# Patient Record
Sex: FEMALE | Race: WHITE | Employment: OTHER | URBAN - METROPOLITAN AREA
[De-identification: names, ages, dates, MRNs, and addresses within clinical notes are randomized per-mention and may not be internally consistent; named-entity substitution may affect disease eponyms.]

---

## 2017-07-11 ENCOUNTER — HOSPITAL ENCOUNTER (OUTPATIENT)
Dept: GENERAL RADIOLOGY | Age: 67
Discharge: HOME | End: 2017-07-11
Payer: MEDICARE

## 2017-07-11 DIAGNOSIS — M48.062 NEUROGENIC CLAUDICATION DUE TO LUMBAR SPINAL STENOSIS: ICD-10-CM

## 2017-07-11 DIAGNOSIS — M54.50 ACUTE MIDLINE LOW BACK PAIN WITHOUT SCIATICA: ICD-10-CM

## 2017-07-11 PROBLEM — M48.061 SPINAL STENOSIS, LUMBAR: Status: ACTIVE | Noted: 2017-07-11

## 2017-07-11 PROCEDURE — 72110 X-RAY EXAM L-2 SPINE 4/>VWS: CPT

## 2017-09-25 ENCOUNTER — HOSPITAL ENCOUNTER (OUTPATIENT)
Dept: SURGERY | Age: 67
Discharge: HOME | End: 2017-09-25
Payer: MEDICARE

## 2017-09-25 VITALS
TEMPERATURE: 98.1 F | HEART RATE: 77 BPM | BODY MASS INDEX: 34.76 KG/M2 | DIASTOLIC BLOOD PRESSURE: 71 MMHG | HEIGHT: 61 IN | WEIGHT: 184.1 LBS | SYSTOLIC BLOOD PRESSURE: 122 MMHG | OXYGEN SATURATION: 95 % | RESPIRATION RATE: 20 BRPM

## 2017-09-25 LAB
ANION GAP SERPL CALC-SCNC: 9 MMOL/L (ref 7–16)
APPEARANCE UR: ABNORMAL
ATRIAL RATE: 69 BPM
BACTERIA SPEC CULT: NORMAL
BACTERIA URNS QL MICRO: ABNORMAL /HPF
BASOPHILS # BLD: 0 K/UL (ref 0–0.2)
BASOPHILS NFR BLD: 0 % (ref 0–2)
BILIRUB UR QL: NEGATIVE
BUN SERPL-MCNC: 11 MG/DL (ref 8–23)
CALCIUM SERPL-MCNC: 9 MG/DL (ref 8.3–10.4)
CALCULATED P AXIS, ECG09: 37 DEGREES
CALCULATED R AXIS, ECG10: -66 DEGREES
CALCULATED T AXIS, ECG11: 13 DEGREES
CASTS URNS QL MICRO: ABNORMAL /LPF
CHLORIDE SERPL-SCNC: 105 MMOL/L (ref 98–107)
CO2 SERPL-SCNC: 25 MMOL/L (ref 21–32)
COLOR UR: YELLOW
CREAT SERPL-MCNC: 0.74 MG/DL (ref 0.6–1)
DIAGNOSIS, 93000: NORMAL
DIFFERENTIAL METHOD BLD: NORMAL
EOSINOPHIL # BLD: 0.1 K/UL (ref 0–0.8)
EOSINOPHIL NFR BLD: 1 % (ref 0.5–7.8)
EPI CELLS #/AREA URNS HPF: ABNORMAL /HPF
ERYTHROCYTE [DISTWIDTH] IN BLOOD BY AUTOMATED COUNT: 12.3 % (ref 11.9–14.6)
EST. AVERAGE GLUCOSE BLD GHB EST-MCNC: 126 MG/DL
GLUCOSE BLD STRIP.AUTO-MCNC: 147 MG/DL (ref 65–100)
GLUCOSE SERPL-MCNC: 133 MG/DL (ref 65–100)
GLUCOSE UR STRIP.AUTO-MCNC: NEGATIVE MG/DL
HBA1C MFR BLD: 6 % (ref 4.8–6)
HCT VFR BLD AUTO: 41.6 % (ref 35.8–46.3)
HGB BLD-MCNC: 14.5 G/DL (ref 11.7–15.4)
HGB UR QL STRIP: NEGATIVE
IMM GRANULOCYTES # BLD: 0 K/UL (ref 0–0.5)
IMM GRANULOCYTES NFR BLD: 0.2 % (ref 0–5)
KETONES UR QL STRIP.AUTO: NEGATIVE MG/DL
LEUKOCYTE ESTERASE UR QL STRIP.AUTO: ABNORMAL
LYMPHOCYTES # BLD: 1.7 K/UL (ref 0.5–4.6)
LYMPHOCYTES NFR BLD: 18 % (ref 13–44)
MCH RBC QN AUTO: 32.4 PG (ref 26.1–32.9)
MCHC RBC AUTO-ENTMCNC: 34.9 G/DL (ref 31.4–35)
MCV RBC AUTO: 93.1 FL (ref 79.6–97.8)
MONOCYTES # BLD: 0.9 K/UL (ref 0.1–1.3)
MONOCYTES NFR BLD: 10 % (ref 4–12)
NEUTS SEG # BLD: 6.7 K/UL (ref 1.7–8.2)
NEUTS SEG NFR BLD: 71 % (ref 43–78)
NITRITE UR QL STRIP.AUTO: POSITIVE
P-R INTERVAL, ECG05: 128 MS
PH UR STRIP: 6 [PH] (ref 5–9)
PLATELET # BLD AUTO: 215 K/UL (ref 150–450)
PMV BLD AUTO: 10.8 FL (ref 10.8–14.1)
POTASSIUM SERPL-SCNC: 3.7 MMOL/L (ref 3.5–5.1)
PROT UR STRIP-MCNC: NEGATIVE MG/DL
Q-T INTERVAL, ECG07: 430 MS
QRS DURATION, ECG06: 124 MS
QTC CALCULATION (BEZET), ECG08: 460 MS
RBC # BLD AUTO: 4.47 M/UL (ref 4.05–5.25)
RBC #/AREA URNS HPF: ABNORMAL /HPF
SERVICE CMNT-IMP: NORMAL
SODIUM SERPL-SCNC: 139 MMOL/L (ref 136–145)
SP GR UR REFRACTOMETRY: 1.02 (ref 1–1.02)
UROBILINOGEN UR QL STRIP.AUTO: 0.2 EU/DL (ref 0.2–1)
VENTRICULAR RATE, ECG03: 69 BPM
WBC # BLD AUTO: 9.4 K/UL (ref 4.3–11.1)
WBC URNS QL MICRO: ABNORMAL /HPF

## 2017-09-25 PROCEDURE — 85025 COMPLETE CBC W/AUTO DIFF WBC: CPT | Performed by: NEUROLOGICAL SURGERY

## 2017-09-25 PROCEDURE — 87641 MR-STAPH DNA AMP PROBE: CPT | Performed by: NEUROLOGICAL SURGERY

## 2017-09-25 PROCEDURE — 82962 GLUCOSE BLOOD TEST: CPT

## 2017-09-25 PROCEDURE — 77030027138 HC INCENT SPIROMETER -A

## 2017-09-25 PROCEDURE — 80048 BASIC METABOLIC PNL TOTAL CA: CPT | Performed by: NEUROLOGICAL SURGERY

## 2017-09-25 PROCEDURE — 93005 ELECTROCARDIOGRAM TRACING: CPT | Performed by: ANESTHESIOLOGY

## 2017-09-25 PROCEDURE — 83036 HEMOGLOBIN GLYCOSYLATED A1C: CPT | Performed by: NEUROLOGICAL SURGERY

## 2017-09-25 PROCEDURE — 81001 URINALYSIS AUTO W/SCOPE: CPT | Performed by: NEUROLOGICAL SURGERY

## 2017-09-25 RX ORDER — GLUCOSAMINE SULFATE 1500 MG
1000 POWDER IN PACKET (EA) ORAL DAILY
COMMUNITY

## 2017-09-25 NOTE — PERIOP NOTES
Recent Results (from the past 12 hour(s))   URINALYSIS W/ RFLX MICROSCOPIC    Collection Time: 09/25/17 11:06 AM   Result Value Ref Range    Color YELLOW      Appearance CLOUDY      Specific gravity 1.018 1.001 - 1.023      pH (UA) 6.0 5.0 - 9.0      Protein NEGATIVE  NEG mg/dL    Glucose NEGATIVE  mg/dL    Ketone NEGATIVE  NEG mg/dL    Bilirubin NEGATIVE  NEG      Blood NEGATIVE  NEG      Urobilinogen 0.2 0.2 - 1.0 EU/dL    Nitrites POSITIVE (A) NEG      Leukocyte Esterase MODERATE (A) NEG      WBC 20-50 0 /hpf    RBC 0-3 0 /hpf    Epithelial cells 0-3 0 /hpf    Bacteria 4+ (H) 0 /hpf    Casts 3-5 0 /lpf   HEMOGLOBIN A1C WITH EAG    Collection Time: 09/25/17 11:07 AM   Result Value Ref Range    Hemoglobin A1c 6.0 4.8 - 6.0 %    Est. average glucose 126 mg/dL   CBC WITH AUTOMATED DIFF    Collection Time: 09/25/17 11:07 AM   Result Value Ref Range    WBC 9.4 4.3 - 11.1 K/uL    RBC 4.47 4.05 - 5.25 M/uL    HGB 14.5 11.7 - 15.4 g/dL    HCT 41.6 35.8 - 46.3 %    MCV 93.1 79.6 - 97.8 FL    MCH 32.4 26.1 - 32.9 PG    MCHC 34.9 31.4 - 35.0 g/dL    RDW 12.3 11.9 - 14.6 %    PLATELET 468 139 - 790 K/uL    MPV 10.8 10.8 - 14.1 FL    DF AUTOMATED      NEUTROPHILS 71 43 - 78 %    LYMPHOCYTES 18 13 - 44 %    MONOCYTES 10 4.0 - 12.0 %    EOSINOPHILS 1 0.5 - 7.8 %    BASOPHILS 0 0.0 - 2.0 %    IMMATURE GRANULOCYTES 0.2 0.0 - 5.0 %    ABS. NEUTROPHILS 6.7 1.7 - 8.2 K/UL    ABS. LYMPHOCYTES 1.7 0.5 - 4.6 K/UL    ABS. MONOCYTES 0.9 0.1 - 1.3 K/UL    ABS. EOSINOPHILS 0.1 0.0 - 0.8 K/UL    ABS. BASOPHILS 0.0 0.0 - 0.2 K/UL    ABS. IMM.  GRANS. 0.0 0.0 - 0.5 K/UL   METABOLIC PANEL, BASIC    Collection Time: 09/25/17 11:07 AM   Result Value Ref Range    Sodium 139 136 - 145 mmol/L    Potassium 3.7 3.5 - 5.1 mmol/L    Chloride 105 98 - 107 mmol/L    CO2 25 21 - 32 mmol/L    Anion gap 9 7 - 16 mmol/L    Glucose 133 (H) 65 - 100 mg/dL    BUN 11 8 - 23 MG/DL    Creatinine 0.74 0.6 - 1.0 MG/DL    GFR est AA >60 >60 ml/min/1.73m2    GFR est non-AA >60 >60 ml/min/1.73m2    Calcium 9.0 8.3 - 10.4 MG/DL   MSSA/MRSA SC BY PCR, NASAL SWAB    Collection Time: 09/25/17 11:07 AM   Result Value Ref Range    Special Requests: NO SPECIAL REQUESTS      Culture result:        SA target not detected. A MRSA NEGATIVE, SA NEGATIVE test result does not preclude MRSA or SA nasal colonization. GLUCOSE, POC    Collection Time: 09/25/17 11:11 AM   Result Value Ref Range    Glucose (POC) 147 (H) 65 - 100 mg/dL   EKG, 12 LEAD, INITIAL    Collection Time: 09/25/17 11:17 AM   Result Value Ref Range    Ventricular Rate 69 BPM    Atrial Rate 69 BPM    P-R Interval 128 ms    QRS Duration 124 ms    Q-T Interval 430 ms    QTC Calculation (Bezet) 460 ms    Calculated P Axis 37 degrees    Calculated R Axis -66 degrees    Calculated T Axis 13 degrees    Diagnosis       Normal sinus rhythm  Right bundle branch block  Left anterior fascicular block  !!! Bifascicular block !!!   When compared with ECG of 19-AUG-2009 14:55,  Right bundle branch block has replaced Incomplete right bundle branch block  Confirmed by ST PRASHANT CASTRO MD (), SHAISTA GARCÍA ([id number]) on 9/25/2017 12:27:32 PM

## 2017-09-25 NOTE — PERIOP NOTES
Reviewed EKG from 9/25/17 w/ Dr. Juan C Good in person and that pts hx is DM, HTN, w/ no cardiac hx or workup. No orders given.

## 2017-09-25 NOTE — PERIOP NOTES
Patient verified name, , and surgery as listed in [facility]. TYPE  CASE:3              Orders:  received  Labs per Spine protocol:  A1c/CBC/BMP/UA/MRSA   Labs per anesthesia protocol: GLUCOSE 147. T/S Order signed and held for PREOP   EKG/cardiac records  :  EKG PERFORMED PER PROTOCOL    Instructed patient to continue previous medications as prescribed prior to surgery and  to take the following medications the day of surgery according to anesthesia guidelines with a small sip of water, med bottles NOT visualized : XANAX IF NEEDED        Continue all previous medications unless otherwise directed. Instructed patient to hold  the following medications prior to surgery: ALL VITAMINS/SUPPLEMENTS, MELOXICAM    Pt viewed Spine Pre-hab video. All further questions were addressed. Pt was provided with antibacterial soap, Hibiclens, long-handled sponge, Spine Recovery booklet and incentive spirometer. Pt correctly demonstrated use of incentive spirometer and instruction to bring it to the hospital on day of surgery. Handouts and all Surgery instructions provided to pt and pt verbalizes understanding. Patient instructed on the following and verbalized understanding:  Arrive at MAIN entrance, time of arrival to be called the day before by 1700. Responsible adult must drive patient to and from hospital, stay during surgery and 24 hours postoperatively. Npo after midnight including gum, mints and ice chips. Use hibiclens in the shower the night before and the morning of surgery. Leave all valuables at home. Instructed on no jewelry or body piercings on the dos. Bring insurance card and ID. No perfumes, oil, powder, colognes, makeup or  lotions on the skin. Patient verbalized understanding of all instructions and provided all medical/health information to the best of their ability.        Pt teach back was successful and pt demonstrates knowledge of instruction

## 2017-11-27 ENCOUNTER — HOSPITAL ENCOUNTER (OUTPATIENT)
Dept: SURGERY | Age: 67
Discharge: HOME | DRG: 460 | End: 2017-11-27
Payer: MEDICARE

## 2017-11-27 VITALS
SYSTOLIC BLOOD PRESSURE: 122 MMHG | DIASTOLIC BLOOD PRESSURE: 71 MMHG | RESPIRATION RATE: 18 BRPM | BODY MASS INDEX: 33.16 KG/M2 | OXYGEN SATURATION: 98 % | HEIGHT: 62 IN | HEART RATE: 84 BPM | TEMPERATURE: 98.4 F | WEIGHT: 180.19 LBS

## 2017-11-27 LAB
ANION GAP SERPL CALC-SCNC: 8 MMOL/L (ref 7–16)
APPEARANCE UR: CLEAR
BACTERIA SPEC CULT: NORMAL
BILIRUB UR QL: NEGATIVE
BUN SERPL-MCNC: 13 MG/DL (ref 8–23)
CALCIUM SERPL-MCNC: 9.2 MG/DL (ref 8.3–10.4)
CHLORIDE SERPL-SCNC: 104 MMOL/L (ref 98–107)
CO2 SERPL-SCNC: 29 MMOL/L (ref 21–32)
COLOR UR: YELLOW
CREAT SERPL-MCNC: 0.92 MG/DL (ref 0.6–1)
ERYTHROCYTE [DISTWIDTH] IN BLOOD BY AUTOMATED COUNT: 12.4 % (ref 11.9–14.6)
EST. AVERAGE GLUCOSE BLD GHB EST-MCNC: 123 MG/DL
GLUCOSE BLD STRIP.AUTO-MCNC: 175 MG/DL (ref 65–100)
GLUCOSE SERPL-MCNC: 179 MG/DL (ref 65–100)
GLUCOSE UR STRIP.AUTO-MCNC: NEGATIVE MG/DL
HBA1C MFR BLD: 5.9 % (ref 4.8–6)
HCT VFR BLD AUTO: 42.4 % (ref 35.8–46.3)
HGB BLD-MCNC: 14.3 G/DL (ref 11.7–15.4)
HGB UR QL STRIP: NEGATIVE
KETONES UR QL STRIP.AUTO: NEGATIVE MG/DL
LEUKOCYTE ESTERASE UR QL STRIP.AUTO: NEGATIVE
MCH RBC QN AUTO: 31.7 PG (ref 26.1–32.9)
MCHC RBC AUTO-ENTMCNC: 33.7 G/DL (ref 31.4–35)
MCV RBC AUTO: 94 FL (ref 79.6–97.8)
NITRITE UR QL STRIP.AUTO: NEGATIVE
PH UR STRIP: 6 [PH] (ref 5–9)
PLATELET # BLD AUTO: 226 K/UL (ref 150–450)
PMV BLD AUTO: 10.6 FL (ref 10.8–14.1)
POTASSIUM SERPL-SCNC: 3.9 MMOL/L (ref 3.5–5.1)
PROT UR STRIP-MCNC: NEGATIVE MG/DL
RBC # BLD AUTO: 4.51 M/UL (ref 4.05–5.25)
SERVICE CMNT-IMP: NORMAL
SODIUM SERPL-SCNC: 141 MMOL/L (ref 136–145)
SP GR UR REFRACTOMETRY: 1.02 (ref 1–1.02)
UROBILINOGEN UR QL STRIP.AUTO: 0.2 EU/DL (ref 0.2–1)
WBC # BLD AUTO: 7.2 K/UL (ref 4.3–11.1)

## 2017-11-27 NOTE — PERIOP NOTES
Patient verified name, , and surgery as listed in [facility]. Patient provided medical/health information and PTA medications to the best of their ability. TYPE  CASE:3  Orders per surgeon: Received   Labs per surgeon:CBC,BMP,UA, MRSA, Hgb A1c. Results: pending   Labs per anesthesia protocol: , SQBS 171,T/S DOS. Results pending  EKG  :  2017  Dr Nigel Scott notified of pt surgery and length. Does not need to see pt at this time. Patient provided with and instructed on education handouts including Guide to Surgery, blood transfusions, pain management, and hand hygiene for the family and community, and [facility] brochure. Hibiclens and instructions given per hospital policy. Instructed patient to continue previous medications as prescribed prior to surgery unless otherwise directed and to take the following medications the day of surgery according to anesthesia guidelines : Xanax . Instructed patient to hold  the following medications: Vitamin D, Fish Oil, Meloxicam.    Original medication prescription bottles not  visualized during patient appointment. Patient teach back successful and patient demonstrates knowledge of instruction.

## 2017-11-27 NOTE — PERIOP NOTES
Recent Results (from the past 12 hour(s))   GLUCOSE, POC    Collection Time: 11/27/17  1:17 PM   Result Value Ref Range    Glucose (POC) 175 (H) 65 - 100 mg/dL   CBC W/O DIFF    Collection Time: 11/27/17  1:19 PM   Result Value Ref Range    WBC 7.2 4.3 - 11.1 K/uL    RBC 4.51 4.05 - 5.25 M/uL    HGB 14.3 11.7 - 15.4 g/dL    HCT 42.4 35.8 - 46.3 %    MCV 94.0 79.6 - 97.8 FL    MCH 31.7 26.1 - 32.9 PG    MCHC 33.7 31.4 - 35.0 g/dL    RDW 12.4 11.9 - 14.6 %    PLATELET 659 021 - 338 K/uL    MPV 10.6 (L) 10.8 - 86.9 FL   METABOLIC PANEL, BASIC    Collection Time: 11/27/17  1:19 PM   Result Value Ref Range    Sodium 141 136 - 145 mmol/L    Potassium 3.9 3.5 - 5.1 mmol/L    Chloride 104 98 - 107 mmol/L    CO2 29 21 - 32 mmol/L    Anion gap 8 7 - 16 mmol/L    Glucose 179 (H) 65 - 100 mg/dL    BUN 13 8 - 23 MG/DL    Creatinine 0.92 0.6 - 1.0 MG/DL    GFR est AA >60 >60 ml/min/1.73m2    GFR est non-AA >60 >60 ml/min/1.73m2    Calcium 9.2 8.3 - 10.4 MG/DL   URINALYSIS W/ RFLX MICROSCOPIC    Collection Time: 11/27/17  1:19 PM   Result Value Ref Range    Color YELLOW      Appearance CLEAR      Specific gravity 1.019 1.001 - 1.023      pH (UA) 6.0 5.0 - 9.0      Protein NEGATIVE  NEG mg/dL    Glucose NEGATIVE  mg/dL    Ketone NEGATIVE  NEG mg/dL    Bilirubin NEGATIVE  NEG      Blood NEGATIVE  NEG      Urobilinogen 0.2 0.2 - 1.0 EU/dL    Nitrites NEGATIVE  NEG      Leukocyte Esterase NEGATIVE  NEG     MSSA/MRSA SC BY PCR, NASAL SWAB    Collection Time: 11/27/17  1:19 PM   Result Value Ref Range    Special Requests: NO SPECIAL REQUESTS      Culture result:        SA target not detected. A MRSA NEGATIVE, SA NEGATIVE test result does not preclude MRSA or SA nasal colonization.    HEMOGLOBIN A1C WITH EAG    Collection Time: 11/27/17  1:19 PM   Result Value Ref Range    Hemoglobin A1c 5.9 4.8 - 6.0 %    Est. average glucose 123 mg/dL   Reviewed

## 2017-11-30 ENCOUNTER — HOSPITAL ENCOUNTER (INPATIENT)
Age: 67
LOS: 4 days | Discharge: HOME | DRG: 460 | End: 2017-12-04
Attending: NEUROLOGICAL SURGERY | Admitting: NEUROLOGICAL SURGERY
Payer: MEDICARE

## 2017-11-30 ENCOUNTER — APPOINTMENT (OUTPATIENT)
Dept: GENERAL RADIOLOGY | Age: 67
DRG: 460 | End: 2017-11-30
Attending: NEUROLOGICAL SURGERY
Payer: MEDICARE

## 2017-11-30 ENCOUNTER — ANESTHESIA EVENT (OUTPATIENT)
Dept: SURGERY | Age: 67
DRG: 460 | End: 2017-11-30
Payer: MEDICARE

## 2017-11-30 ENCOUNTER — ANESTHESIA (OUTPATIENT)
Dept: SURGERY | Age: 67
DRG: 460 | End: 2017-11-30
Payer: MEDICARE

## 2017-11-30 PROBLEM — M48.062 LUMBAR STENOSIS WITH NEUROGENIC CLAUDICATION: Status: ACTIVE | Noted: 2017-11-30

## 2017-11-30 LAB
ABO + RH BLD: NORMAL
BLOOD GROUP ANTIBODIES SERPL: NORMAL
GLUCOSE BLD STRIP.AUTO-MCNC: 107 MG/DL (ref 65–100)
GLUCOSE BLD STRIP.AUTO-MCNC: 153 MG/DL (ref 65–100)
SPECIMEN EXP DATE BLD: NORMAL

## 2017-11-30 PROCEDURE — 77030003029 HC SUT VCRL J&J -B: Performed by: NEUROLOGICAL SURGERY

## 2017-11-30 PROCEDURE — 76060000038 HC ANESTHESIA 3.5 TO 4 HR: Performed by: NEUROLOGICAL SURGERY

## 2017-11-30 PROCEDURE — 77030010507 HC ADH SKN DERMBND J&J -B: Performed by: NEUROLOGICAL SURGERY

## 2017-11-30 PROCEDURE — 77030004391 HC BUR FLUT MEDT -C: Performed by: NEUROLOGICAL SURGERY

## 2017-11-30 PROCEDURE — 77030020782 HC GWN BAIR PAWS FLX 3M -B: Performed by: ANESTHESIOLOGY

## 2017-11-30 PROCEDURE — 77030019908 HC STETH ESOPH SIMS -A: Performed by: ANESTHESIOLOGY

## 2017-11-30 PROCEDURE — 77030020407 HC IV BLD WRMR ST 3M -A: Performed by: ANESTHESIOLOGY

## 2017-11-30 PROCEDURE — 77030011640 HC PAD GRND REM COVD -A: Performed by: NEUROLOGICAL SURGERY

## 2017-11-30 PROCEDURE — 65270000029 HC RM PRIVATE

## 2017-11-30 PROCEDURE — 74011250637 HC RX REV CODE- 250/637: Performed by: ANESTHESIOLOGY

## 2017-11-30 PROCEDURE — 77030003028 HC SUT VCRL J&J -A: Performed by: NEUROLOGICAL SURGERY

## 2017-11-30 PROCEDURE — 77030014007 HC SPNG HEMSTAT J&J -B: Performed by: NEUROLOGICAL SURGERY

## 2017-11-30 PROCEDURE — 74011000250 HC RX REV CODE- 250: Performed by: ANESTHESIOLOGY

## 2017-11-30 PROCEDURE — 77030018673: Performed by: NEUROLOGICAL SURGERY

## 2017-11-30 PROCEDURE — 94760 N-INVAS EAR/PLS OXIMETRY 1: CPT

## 2017-11-30 PROCEDURE — 82962 GLUCOSE BLOOD TEST: CPT

## 2017-11-30 PROCEDURE — 77030032490 HC SLV COMPR SCD KNE COVD -B: Performed by: NEUROLOGICAL SURGERY

## 2017-11-30 PROCEDURE — 77030018836 HC SOL IRR NACL ICUM -A: Performed by: NEUROLOGICAL SURGERY

## 2017-11-30 PROCEDURE — 0SB20ZZ EXCISION OF LUMBAR VERTEBRAL DISC, OPEN APPROACH: ICD-10-PCS | Performed by: NEUROLOGICAL SURGERY

## 2017-11-30 PROCEDURE — 74011000250 HC RX REV CODE- 250

## 2017-11-30 PROCEDURE — 74011250636 HC RX REV CODE- 250/636: Performed by: NEUROLOGICAL SURGERY

## 2017-11-30 PROCEDURE — 0QW004Z REVISION OF INTERNAL FIXATION DEVICE IN LUMBAR VERTEBRA, OPEN APPROACH: ICD-10-PCS | Performed by: NEUROLOGICAL SURGERY

## 2017-11-30 PROCEDURE — 77030022373 HC SPCR SPN STAXX SPWV -K1: Performed by: NEUROLOGICAL SURGERY

## 2017-11-30 PROCEDURE — 77030003451 HC NDL BIOP BN MEDT -C: Performed by: NEUROLOGICAL SURGERY

## 2017-11-30 PROCEDURE — 0SG00AJ FUSION OF LUMBAR VERTEBRAL JOINT WITH INTERBODY FUSION DEVICE, POSTERIOR APPROACH, ANTERIOR COLUMN, OPEN APPROACH: ICD-10-PCS | Performed by: NEUROLOGICAL SURGERY

## 2017-11-30 PROCEDURE — 74011250636 HC RX REV CODE- 250/636: Performed by: ANESTHESIOLOGY

## 2017-11-30 PROCEDURE — 77030012894: Performed by: NEUROLOGICAL SURGERY

## 2017-11-30 PROCEDURE — 77030034475 HC MISC IMPL SPN: Performed by: NEUROLOGICAL SURGERY

## 2017-11-30 PROCEDURE — C1713 ANCHOR/SCREW BN/BN,TIS/BN: HCPCS | Performed by: NEUROLOGICAL SURGERY

## 2017-11-30 PROCEDURE — 77030003666 HC NDL SPINAL BD -A: Performed by: NEUROLOGICAL SURGERY

## 2017-11-30 PROCEDURE — 77030027138 HC INCENT SPIROMETER -A

## 2017-11-30 PROCEDURE — 01NB0ZZ RELEASE LUMBAR NERVE, OPEN APPROACH: ICD-10-PCS | Performed by: NEUROLOGICAL SURGERY

## 2017-11-30 PROCEDURE — 74011250636 HC RX REV CODE- 250/636

## 2017-11-30 PROCEDURE — 74011250637 HC RX REV CODE- 250/637: Performed by: NEUROLOGICAL SURGERY

## 2017-11-30 PROCEDURE — 77030019940 HC BLNKT HYPOTHRM STRY -B: Performed by: ANESTHESIOLOGY

## 2017-11-30 PROCEDURE — 77030012407 HC DRN WND BARD -B: Performed by: NEUROLOGICAL SURGERY

## 2017-11-30 PROCEDURE — 77030008477 HC STYL SATN SLP COVD -A: Performed by: ANESTHESIOLOGY

## 2017-11-30 PROCEDURE — 76210000006 HC OR PH I REC 0.5 TO 1 HR: Performed by: NEUROLOGICAL SURGERY

## 2017-11-30 PROCEDURE — 77030003193 HC GRFT RECOMB BN MEDT -H: Performed by: NEUROLOGICAL SURGERY

## 2017-11-30 PROCEDURE — 86900 BLOOD TYPING SEROLOGIC ABO: CPT | Performed by: ANESTHESIOLOGY

## 2017-11-30 PROCEDURE — 77030008703 HC TU ET UNCUF COVD -A: Performed by: ANESTHESIOLOGY

## 2017-11-30 PROCEDURE — 74011000272 HC RX REV CODE- 272: Performed by: NEUROLOGICAL SURGERY

## 2017-11-30 PROCEDURE — 77030030163 HC BN WAX J&J -A: Performed by: NEUROLOGICAL SURGERY

## 2017-11-30 PROCEDURE — 77030013567 HC DRN WND RESERV BARD -A: Performed by: NEUROLOGICAL SURGERY

## 2017-11-30 PROCEDURE — 74011000250 HC RX REV CODE- 250: Performed by: NEUROLOGICAL SURGERY

## 2017-11-30 PROCEDURE — 77010033678 HC OXYGEN DAILY

## 2017-11-30 PROCEDURE — 77030019557 HC ELECTRD VES SEAL MEDT -F: Performed by: NEUROLOGICAL SURGERY

## 2017-11-30 PROCEDURE — 76010000174 HC OR TIME 3.5 TO 4 HR INTENSV-TIER 1: Performed by: NEUROLOGICAL SURGERY

## 2017-11-30 DEVICE — SPACER SPNL W9XH7XL22MM TANT MRK LO PROF SELF EXP CONVX: Type: IMPLANTABLE DEVICE | Site: SPINE LUMBAR | Status: FUNCTIONAL

## 2017-11-30 DEVICE — SCREW 75446550 MULTI AXIAL 6.5X50  TI
Type: IMPLANTABLE DEVICE | Site: SPINE LUMBAR | Status: FUNCTIONAL
Brand: CD HORIZON® SPINAL SYSTEM

## 2017-11-30 DEVICE — IMPLANTABLE DEVICE: Type: IMPLANTABLE DEVICE | Site: SPINE LUMBAR | Status: FUNCTIONAL

## 2017-11-30 DEVICE — BONE GRAFT KIT 7510100 INFUSE X SMALL
Type: IMPLANTABLE DEVICE | Site: SPINE LUMBAR | Status: FUNCTIONAL
Brand: INFUSE® BONE GRAFT

## 2017-11-30 RX ORDER — NEOSTIGMINE METHYLSULFATE 1 MG/ML
INJECTION INTRAVENOUS AS NEEDED
Status: DISCONTINUED | OUTPATIENT
Start: 2017-11-30 | End: 2017-11-30 | Stop reason: HOSPADM

## 2017-11-30 RX ORDER — SODIUM CHLORIDE, SODIUM LACTATE, POTASSIUM CHLORIDE, CALCIUM CHLORIDE 600; 310; 30; 20 MG/100ML; MG/100ML; MG/100ML; MG/100ML
125 INJECTION, SOLUTION INTRAVENOUS CONTINUOUS
Status: DISCONTINUED | OUTPATIENT
Start: 2017-11-30 | End: 2017-11-30 | Stop reason: HOSPADM

## 2017-11-30 RX ORDER — OXYCODONE AND ACETAMINOPHEN 5; 325 MG/1; MG/1
2 TABLET ORAL
Status: DISCONTINUED | OUTPATIENT
Start: 2017-11-30 | End: 2017-12-02

## 2017-11-30 RX ORDER — LIDOCAINE HYDROCHLORIDE AND EPINEPHRINE 10; 10 MG/ML; UG/ML
INJECTION, SOLUTION INFILTRATION; PERINEURAL AS NEEDED
Status: DISCONTINUED | OUTPATIENT
Start: 2017-11-30 | End: 2017-11-30 | Stop reason: HOSPADM

## 2017-11-30 RX ORDER — PRAVASTATIN SODIUM 20 MG/1
40 TABLET ORAL
Status: DISCONTINUED | OUTPATIENT
Start: 2017-11-30 | End: 2017-12-04 | Stop reason: HOSPADM

## 2017-11-30 RX ORDER — PREGABALIN 150 MG/1
300 CAPSULE ORAL
Status: DISCONTINUED | OUTPATIENT
Start: 2017-11-30 | End: 2017-12-04 | Stop reason: HOSPADM

## 2017-11-30 RX ORDER — ONDANSETRON 2 MG/ML
4 INJECTION INTRAMUSCULAR; INTRAVENOUS
Status: DISCONTINUED | OUTPATIENT
Start: 2017-11-30 | End: 2017-12-04 | Stop reason: HOSPADM

## 2017-11-30 RX ORDER — ALPRAZOLAM 0.5 MG/1
1 TABLET ORAL
Status: DISCONTINUED | OUTPATIENT
Start: 2017-11-30 | End: 2017-12-02

## 2017-11-30 RX ORDER — ESMOLOL HYDROCHLORIDE 10 MG/ML
INJECTION INTRAVENOUS AS NEEDED
Status: DISCONTINUED | OUTPATIENT
Start: 2017-11-30 | End: 2017-11-30 | Stop reason: HOSPADM

## 2017-11-30 RX ORDER — OXYCODONE HYDROCHLORIDE 5 MG/1
10 TABLET ORAL
Status: DISCONTINUED | OUTPATIENT
Start: 2017-11-30 | End: 2017-11-30 | Stop reason: HOSPADM

## 2017-11-30 RX ORDER — LIDOCAINE HYDROCHLORIDE 20 MG/ML
INJECTION, SOLUTION EPIDURAL; INFILTRATION; INTRACAUDAL; PERINEURAL AS NEEDED
Status: DISCONTINUED | OUTPATIENT
Start: 2017-11-30 | End: 2017-11-30 | Stop reason: HOSPADM

## 2017-11-30 RX ORDER — DEXAMETHASONE SODIUM PHOSPHATE 4 MG/ML
INJECTION, SOLUTION INTRA-ARTICULAR; INTRALESIONAL; INTRAMUSCULAR; INTRAVENOUS; SOFT TISSUE AS NEEDED
Status: DISCONTINUED | OUTPATIENT
Start: 2017-11-30 | End: 2017-11-30 | Stop reason: HOSPADM

## 2017-11-30 RX ORDER — POTASSIUM CHLORIDE AND SODIUM CHLORIDE 450; 150 MG/100ML; MG/100ML
INJECTION, SOLUTION INTRAVENOUS CONTINUOUS
Status: DISCONTINUED | OUTPATIENT
Start: 2017-11-30 | End: 2017-12-04 | Stop reason: HOSPADM

## 2017-11-30 RX ORDER — FENTANYL CITRATE 50 UG/ML
INJECTION, SOLUTION INTRAMUSCULAR; INTRAVENOUS AS NEEDED
Status: DISCONTINUED | OUTPATIENT
Start: 2017-11-30 | End: 2017-11-30 | Stop reason: HOSPADM

## 2017-11-30 RX ORDER — SODIUM CHLORIDE 0.9 % (FLUSH) 0.9 %
5-10 SYRINGE (ML) INJECTION AS NEEDED
Status: DISCONTINUED | OUTPATIENT
Start: 2017-11-30 | End: 2017-11-30 | Stop reason: HOSPADM

## 2017-11-30 RX ORDER — MORPHINE SULFATE 10 MG/ML
10 INJECTION, SOLUTION INTRAMUSCULAR; INTRAVENOUS
Status: DISCONTINUED | OUTPATIENT
Start: 2017-11-30 | End: 2017-12-02

## 2017-11-30 RX ORDER — ONDANSETRON 2 MG/ML
INJECTION INTRAMUSCULAR; INTRAVENOUS AS NEEDED
Status: DISCONTINUED | OUTPATIENT
Start: 2017-11-30 | End: 2017-11-30 | Stop reason: HOSPADM

## 2017-11-30 RX ORDER — NALOXONE HYDROCHLORIDE 0.4 MG/ML
0.4 INJECTION, SOLUTION INTRAMUSCULAR; INTRAVENOUS; SUBCUTANEOUS AS NEEDED
Status: DISCONTINUED | OUTPATIENT
Start: 2017-11-30 | End: 2017-12-04 | Stop reason: HOSPADM

## 2017-11-30 RX ORDER — NALOXONE HYDROCHLORIDE 0.4 MG/ML
0.1 INJECTION, SOLUTION INTRAMUSCULAR; INTRAVENOUS; SUBCUTANEOUS AS NEEDED
Status: DISCONTINUED | OUTPATIENT
Start: 2017-11-30 | End: 2017-11-30 | Stop reason: HOSPADM

## 2017-11-30 RX ORDER — ROCURONIUM BROMIDE 10 MG/ML
INJECTION, SOLUTION INTRAVENOUS AS NEEDED
Status: DISCONTINUED | OUTPATIENT
Start: 2017-11-30 | End: 2017-11-30 | Stop reason: HOSPADM

## 2017-11-30 RX ORDER — LIDOCAINE HYDROCHLORIDE 10 MG/ML
0.1 INJECTION INFILTRATION; PERINEURAL AS NEEDED
Status: DISCONTINUED | OUTPATIENT
Start: 2017-11-30 | End: 2017-11-30 | Stop reason: HOSPADM

## 2017-11-30 RX ORDER — CEFAZOLIN SODIUM IN 0.9 % NACL 2 G/50 ML
2 INTRAVENOUS SOLUTION, PIGGYBACK (ML) INTRAVENOUS ONCE
Status: COMPLETED | OUTPATIENT
Start: 2017-11-30 | End: 2017-11-30

## 2017-11-30 RX ORDER — LOSARTAN POTASSIUM 50 MG/1
100 TABLET ORAL DAILY
Status: DISCONTINUED | OUTPATIENT
Start: 2017-12-01 | End: 2017-12-04 | Stop reason: HOSPADM

## 2017-11-30 RX ORDER — SODIUM CHLORIDE 0.9 % (FLUSH) 0.9 %
5-10 SYRINGE (ML) INJECTION EVERY 8 HOURS
Status: DISCONTINUED | OUTPATIENT
Start: 2017-11-30 | End: 2017-12-04 | Stop reason: HOSPADM

## 2017-11-30 RX ORDER — SODIUM CHLORIDE 0.9 % (FLUSH) 0.9 %
5-10 SYRINGE (ML) INJECTION EVERY 8 HOURS
Status: DISCONTINUED | OUTPATIENT
Start: 2017-11-30 | End: 2017-11-30 | Stop reason: HOSPADM

## 2017-11-30 RX ORDER — AMOXICILLIN 250 MG
2 CAPSULE ORAL DAILY
Status: DISCONTINUED | OUTPATIENT
Start: 2017-12-01 | End: 2017-12-04

## 2017-11-30 RX ORDER — FAMOTIDINE 20 MG/1
20 TABLET, FILM COATED ORAL EVERY 12 HOURS
Status: DISCONTINUED | OUTPATIENT
Start: 2017-11-30 | End: 2017-12-04 | Stop reason: HOSPADM

## 2017-11-30 RX ORDER — PROPOFOL 10 MG/ML
INJECTION, EMULSION INTRAVENOUS AS NEEDED
Status: DISCONTINUED | OUTPATIENT
Start: 2017-11-30 | End: 2017-11-30 | Stop reason: HOSPADM

## 2017-11-30 RX ORDER — INSULIN LISPRO 100 [IU]/ML
INJECTION, SOLUTION INTRAVENOUS; SUBCUTANEOUS
Status: DISCONTINUED | OUTPATIENT
Start: 2017-11-30 | End: 2017-12-04 | Stop reason: HOSPADM

## 2017-11-30 RX ORDER — HYDROMORPHONE HYDROCHLORIDE 2 MG/ML
0.5 INJECTION, SOLUTION INTRAMUSCULAR; INTRAVENOUS; SUBCUTANEOUS
Status: DISCONTINUED | OUTPATIENT
Start: 2017-11-30 | End: 2017-11-30 | Stop reason: HOSPADM

## 2017-11-30 RX ORDER — CLONIDINE HYDROCHLORIDE 0.1 MG/1
0.1 TABLET ORAL
Status: DISCONTINUED | OUTPATIENT
Start: 2017-11-30 | End: 2017-12-04 | Stop reason: HOSPADM

## 2017-11-30 RX ORDER — ACETAMINOPHEN 325 MG/1
650 TABLET ORAL
Status: DISCONTINUED | OUTPATIENT
Start: 2017-11-30 | End: 2017-12-02

## 2017-11-30 RX ORDER — MIDAZOLAM HYDROCHLORIDE 1 MG/ML
2 INJECTION, SOLUTION INTRAMUSCULAR; INTRAVENOUS
Status: DISCONTINUED | OUTPATIENT
Start: 2017-11-30 | End: 2017-11-30 | Stop reason: HOSPADM

## 2017-11-30 RX ORDER — GLYCOPYRROLATE 0.2 MG/ML
INJECTION INTRAMUSCULAR; INTRAVENOUS AS NEEDED
Status: DISCONTINUED | OUTPATIENT
Start: 2017-11-30 | End: 2017-11-30 | Stop reason: HOSPADM

## 2017-11-30 RX ORDER — HEPARIN SODIUM 5000 [USP'U]/ML
5000 INJECTION, SOLUTION INTRAVENOUS; SUBCUTANEOUS EVERY 12 HOURS
Status: DISCONTINUED | OUTPATIENT
Start: 2017-11-30 | End: 2017-12-04 | Stop reason: HOSPADM

## 2017-11-30 RX ORDER — METFORMIN HYDROCHLORIDE 500 MG/1
500 TABLET ORAL
Status: DISCONTINUED | OUTPATIENT
Start: 2017-12-01 | End: 2017-12-04 | Stop reason: HOSPADM

## 2017-11-30 RX ORDER — ACETAMINOPHEN 500 MG
1000 TABLET ORAL ONCE
Status: COMPLETED | OUTPATIENT
Start: 2017-11-30 | End: 2017-11-30

## 2017-11-30 RX ORDER — SODIUM CHLORIDE 0.9 % (FLUSH) 0.9 %
5-10 SYRINGE (ML) INJECTION AS NEEDED
Status: DISCONTINUED | OUTPATIENT
Start: 2017-11-30 | End: 2017-12-04 | Stop reason: HOSPADM

## 2017-11-30 RX ORDER — SODIUM CHLORIDE 9 MG/ML
INJECTION, SOLUTION INTRAVENOUS
Status: DISCONTINUED | OUTPATIENT
Start: 2017-11-30 | End: 2017-11-30 | Stop reason: HOSPADM

## 2017-11-30 RX ADMIN — ESMOLOL HYDROCHLORIDE 30 MG: 10 INJECTION INTRAVENOUS at 17:10

## 2017-11-30 RX ADMIN — CEFAZOLIN 2 G: 1 INJECTION, POWDER, FOR SOLUTION INTRAMUSCULAR; INTRAVENOUS; PARENTERAL at 13:58

## 2017-11-30 RX ADMIN — FENTANYL CITRATE 50 MCG: 50 INJECTION, SOLUTION INTRAMUSCULAR; INTRAVENOUS at 13:48

## 2017-11-30 RX ADMIN — ACETAMINOPHEN 1000 MG: 500 TABLET, FILM COATED ORAL at 11:34

## 2017-11-30 RX ADMIN — NEOSTIGMINE METHYLSULFATE 4 MG: 1 INJECTION INTRAVENOUS at 17:09

## 2017-11-30 RX ADMIN — PREGABALIN 300 MG: 150 CAPSULE ORAL at 22:15

## 2017-11-30 RX ADMIN — SODIUM CHLORIDE: 9 INJECTION, SOLUTION INTRAVENOUS at 14:30

## 2017-11-30 RX ADMIN — FENTANYL CITRATE 50 MCG: 50 INJECTION, SOLUTION INTRAMUSCULAR; INTRAVENOUS at 17:04

## 2017-11-30 RX ADMIN — ONDANSETRON 4 MG: 2 INJECTION INTRAMUSCULAR; INTRAVENOUS at 16:50

## 2017-11-30 RX ADMIN — ROCURONIUM BROMIDE 10 MG: 10 INJECTION, SOLUTION INTRAVENOUS at 15:45

## 2017-11-30 RX ADMIN — SODIUM CHLORIDE, SODIUM LACTATE, POTASSIUM CHLORIDE, AND CALCIUM CHLORIDE 125 ML/HR: 600; 310; 30; 20 INJECTION, SOLUTION INTRAVENOUS at 11:32

## 2017-11-30 RX ADMIN — FAMOTIDINE 20 MG: 10 INJECTION, SOLUTION INTRAVENOUS at 11:35

## 2017-11-30 RX ADMIN — SODIUM CHLORIDE AND POTASSIUM CHLORIDE: 4.5; 1.49 INJECTION, SOLUTION INTRAVENOUS at 22:18

## 2017-11-30 RX ADMIN — FENTANYL CITRATE 50 MCG: 50 INJECTION, SOLUTION INTRAMUSCULAR; INTRAVENOUS at 17:21

## 2017-11-30 RX ADMIN — PROPOFOL 180 MG: 10 INJECTION, EMULSION INTRAVENOUS at 13:48

## 2017-11-30 RX ADMIN — FAMOTIDINE 20 MG: 20 TABLET, FILM COATED ORAL at 22:15

## 2017-11-30 RX ADMIN — ROCURONIUM BROMIDE 10 MG: 10 INJECTION, SOLUTION INTRAVENOUS at 15:00

## 2017-11-30 RX ADMIN — HEPARIN SODIUM 5000 UNITS: 5000 INJECTION, SOLUTION INTRAVENOUS; SUBCUTANEOUS at 22:15

## 2017-11-30 RX ADMIN — DEXAMETHASONE SODIUM PHOSPHATE 4 MG: 4 INJECTION, SOLUTION INTRA-ARTICULAR; INTRALESIONAL; INTRAMUSCULAR; INTRAVENOUS; SOFT TISSUE at 15:30

## 2017-11-30 RX ADMIN — LIDOCAINE HYDROCHLORIDE 80 MG: 20 INJECTION, SOLUTION EPIDURAL; INFILTRATION; INTRACAUDAL; PERINEURAL at 13:48

## 2017-11-30 RX ADMIN — HYDROMORPHONE HYDROCHLORIDE 0.5 MG: 2 INJECTION, SOLUTION INTRAMUSCULAR; INTRAVENOUS; SUBCUTANEOUS at 17:40

## 2017-11-30 RX ADMIN — ROCURONIUM BROMIDE 10 MG: 10 INJECTION, SOLUTION INTRAVENOUS at 16:31

## 2017-11-30 RX ADMIN — PRAVASTATIN SODIUM 40 MG: 20 TABLET ORAL at 22:15

## 2017-11-30 RX ADMIN — HYDROMORPHONE HYDROCHLORIDE 0.5 MG: 2 INJECTION, SOLUTION INTRAMUSCULAR; INTRAVENOUS; SUBCUTANEOUS at 17:35

## 2017-11-30 RX ADMIN — ROCURONIUM BROMIDE 50 MG: 10 INJECTION, SOLUTION INTRAVENOUS at 13:49

## 2017-11-30 RX ADMIN — SODIUM CHLORIDE, SODIUM LACTATE, POTASSIUM CHLORIDE, AND CALCIUM CHLORIDE: 600; 310; 30; 20 INJECTION, SOLUTION INTRAVENOUS at 16:14

## 2017-11-30 RX ADMIN — FENTANYL CITRATE 50 MCG: 50 INJECTION, SOLUTION INTRAMUSCULAR; INTRAVENOUS at 13:46

## 2017-11-30 RX ADMIN — GLYCOPYRROLATE 0.6 MG: 0.2 INJECTION INTRAMUSCULAR; INTRAVENOUS at 17:09

## 2017-11-30 NOTE — ANESTHESIA PREPROCEDURE EVALUATION
Anesthetic History               Review of Systems / Medical History  Patient summary reviewed, nursing notes reviewed and pertinent labs reviewed    Pulmonary                   Neuro/Psych              Cardiovascular    Hypertension: well controlled              Exercise tolerance: >4 METS     GI/Hepatic/Renal     GERD: well controlled           Endo/Other    Diabetes: well controlled, type 2    Obesity and arthritis     Other Findings            Physical Exam    Airway  Mallampati: II  TM Distance: 4 - 6 cm  Neck ROM: decreased range of motion   Mouth opening: Normal     Cardiovascular  Regular rate and rhythm,  S1 and S2 normal,  no murmur, click, rub, or gallop             Dental    Dentition: Upper partial plate     Pulmonary  Breath sounds clear to auscultation               Abdominal  GI exam deferred       Other Findings            Anesthetic Plan    ASA: 3  Anesthesia type: general            Anesthetic plan and risks discussed with: Patient and Son / Daughter    Friend present

## 2017-11-30 NOTE — IP AVS SNAPSHOT
[facility] 
 
 
 [address] 
[phone] Patient: Caitlin Hernandez MRN: [id number] JOSE:[id number] About your hospitalization You were admitted on:  November 30, 2017 You last received care in the:  [facility] 7 MED SURG You were discharged on:  December 4, 2017 Why you were hospitalized Your primary diagnosis was:  Lumbar Stenosis With Neurogenic Claudication Your diagnoses also included:  Hypertension, Hypercholesterolemia, Diabetes Mellitus, Type 2 (Hcc) Things You Need To Do (next 8 weeks) Call Jose Perez MD today As needed Phone:  [phone] Where:  [address], [place] [facility] Monday Dec 11, 2017 WOUND CHECK with [facility] NURSE at 10:30 AM  
Where:  [facility] ([facility]) Follow up with Mayo Parra MD  
follow up appointment at 10 30 AM  
  
Phone:  [phone] Where:  [facility], [address] [facility] [address], [address] [place] [address] Way [address] Discharge Orders None A check nando indicates which time of day the medication should be taken. My Medications STOP taking these medications   
 meloxicam 15 mg tablet Commonly known as:  MOBIC  
   
  
  
TAKE these medications as instructed Instructions Each Dose to Equal  
 Morning Noon Evening Bedtime ALPRAZolam 1 mg tablet Commonly known as:  Chloé Anne-Marie Your next dose is: Take today if needed Take 1 mg by mouth as needed for Anxiety or Sleep. 1 mg FISH OIL PO Your next dose is:  Tomorrow morning 12/5 Take  by mouth daily. HYDROcodone-acetaminophen 7.5-325 mg per tablet Commonly known as:  Delfin Punt Your last dose was: This morning 12/4 AT 6 30 am  
Your next dose is: Take today if needed after 10 30 am  
Notes to Patient: This is your pain medication 1-2 tabs po q4-6h prn pain  
     
   
   
   
  
 losartan 100 mg tablet Commonly known as:  COZAAR Your last dose was: This morning 12/4 Your next dose is:  Tomorrow morning 12/5 Take 100 mg by mouth daily. 100 mg  
    
  
   
   
   
  
 LYRICA 300 mg capsule Generic drug:  pregabalin Your last dose was:  LAST NIGHT 12/3 Your next dose is: Take tonight 12/4 Take 300 mg by mouth nightly. 300 mg  
    
   
   
   
  
  
 metFORMIN  mg tablet Commonly known as:  GLUCOPHAGE XR Your last dose was: Last evening 12/3 Your next dose is: This evening 12/4 Take 500 mg by mouth daily (with dinner). 500 mg  
    
   
   
  
   
  
 pravastatin 40 mg tablet Commonly known as:  PRAVACHOL Your last dose was:  LAST NIGHT 12/3 Your next dose is: Take tonight 12/4 Take 40 mg by mouth nightly. 40 mg  
    
   
   
   
  
  
 VITAMIN D3 1,000 unit Cap Generic drug:  cholecalciferol Your next dose is:  Tomorrow morning 12/5 Take 1,000 Units by mouth daily. 1000 Units Where to Get Your Medications Information on where to get these meds will be given to you by the nurse or doctor. ! Ask your nurse or doctor about these medications HYDROcodone-acetaminophen 7.5-325 mg per tablet Discharge Instructions Diet diabetic MAY shower-->NO tub baths CHANGE dressing DAILYremove dressing-->shower-->apply new dressing NO lifting anything heavier than 5LBS  
 
WEAR brace at all times EXCEPT when in bed, just going to the bathroom or showering NO Bending, Lifting or Twisting Avoid sitting more than 20 - 30 minutes at a time NO driving until directed by Dr. Evin Galvan DO NOT take any NSAIDS (either prescribed or over the counter until directed (Aleve, Ibuprofen, Mobic, etc) as this will interfere with bone healing CALL DR. Lima if:  Fever >100.5 ([phone])               Incision becomes red,  Swollen or opens up Incision has yellow, thick drainage or an odor Pain is not managed with prescribed medications Excessive nausea and/or vomiting Avoid having pets sleep in bed with you until incision is completely healed DISCHARGE SUMMARY from Nurse PATIENT INSTRUCTIONS: 
 
 
F-face looks uneven A-arms unable to move or move unevenly S-speech slurred or non-existent T-time-call 911 as soon as signs and symptoms begin-DO NOT go Back to bed or wait to see if you get better-TIME IS BRAIN. Warning Signs of HEART ATTACK Call 911 if you have these symptoms: 
? Chest discomfort. Most heart attacks involve discomfort in the center of the chest that lasts more than a few minutes, or that goes away and comes back. It can feel like uncomfortable pressure, squeezing, fullness, or pain. ? Discomfort in other areas of the upper body. Symptoms can include pain or discomfort in one or both arms, the back, neck, jaw, or stomach. ? Shortness of breath with or without chest discomfort. ? Other signs may include breaking out in a cold sweat, nausea, or lightheadedness. Don't wait more than five minutes to call [address]! Fast action can save your life. Calling 911 is almost always the fastest way to get lifesaving treatment. Emergency Medical Services staff can begin treatment when they arrive  up to an hour sooner than if someone gets to the hospital by car. The discharge information has been reviewed with the patient. The patient verbalized understanding.  
Discharge medications reviewed with the patient and appropriate educational materials and side effects teaching were provided. ___________________________________________________________________________________________________________________________________ [employer] Announcement We are excited to announce that we are making your provider's discharge notes available to you in [employer]. You will see these notes when they are completed and signed by the physician that discharged you from your recent hospital stay. If you have any questions or concerns about any information you see in [employer], please call the Health Information Department where you were seen or reach out to your Primary Care Provider for more information about your plan of care. Introducing [facility]! Dear Olinda Pace: Thank you for requesting a [employer] account. Our records indicate that you already have an active [employer] account. You can access your account anytime at [email or web address] Did you know that you can access your hospital and ER discharge instructions at any time in [employer]? You can also review all of your test results from your hospital stay or ER visit. Additional Information If you have questions, please visit the Frequently Asked Questions section of the [employer] website at [email or web address] Remember, [employer] is NOT to be used for urgent needs. For medical emergencies, dial 911. Now available from your iPhone and Android! Unresulted Labs-Please follow up with your PCP about these lab tests Order Current Status NC XR TECHNOLOGIST SERVICE In process Providers Seen During Your Hospitalization Provider Specialty Primary office phone Reji Penn MD Neurosurgery [phone] Immunizations Administered for This Admission Name Date Influenza Vaccine (Quad) PF 12/4/2017 Your Primary Care Physician (PCP) Primary Care Physician Office Phone Office Fax Yokasta Butler [phone] You are allergic to the following Allergen Reactions No Known Allergies Other (comments) Recent Documentation Height Weight Breastfeeding? BMI OB Status Smoking Status 1.575 m 83.6 kg No 33.69 kg/m2 Hysterectomy Never Smoker Emergency Contacts Name Discharge Info Relation Home Work Mobile Alisa York DISCHARGE CAREGIVER [3] Daughter [21] [phone] Amy Meléndez CAREGIVER [3] Daughter [21] [phone] Ana Maria Branch DISCHARGE CAREGIVER [3] Spouse [3] [phone] Patient Belongings The following personal items are in your possession at time of discharge: 
  Dental Appliances: Partials  Visual Aid: None   Hearing Aids/Status: Does not own  Home Medications: None   Jewelry: None  Clothing: Shirt, Pants, Footwear, Undergarments    Other Valuables: Eyeglasses Please provide this summary of care documentation to your next provider. Signatures-by signing, you are acknowledging that this After Visit Summary has been reviewed with you and you have received a copy. Patient Signature:  ____________________________________________________________ Date:  ____________________________________________________________  
  
[facility] Provider Signature:  ____________________________________________________________ Date:  ____________________________________________________________

## 2017-11-30 NOTE — IP AVS SNAPSHOT
[facility] 
 
 
 [address] 
[phone] Patient: Sarkis Louise MRN: [id number] XHV:[id number] My Medications STOP taking these medications   
 meloxicam 15 mg tablet Commonly known as:  MOBIC  
   
  
  
TAKE these medications as instructed Instructions Each Dose to Equal  
 Morning Noon Evening Bedtime ALPRAZolam 1 mg tablet Commonly known as:  Robert Joaquin Your next dose is: Take today if needed Take 1 mg by mouth as needed for Anxiety or Sleep. 1 mg FISH OIL PO Your next dose is:  Tomorrow morning 12/5 Take  by mouth daily. HYDROcodone-acetaminophen 7.5-325 mg per tablet Commonly known as:  Esther Jordan Your last dose was: This morning 12/4 AT 6 30 am  
Your next dose is: Take today if needed after 10 30 am  
Notes to Patient: This is your pain medication 1-2 tabs po q4-6h prn pain  
     
   
   
   
  
 losartan 100 mg tablet Commonly known as:  COZAAR Your last dose was: This morning 12/4 Your next dose is:  Tomorrow morning 12/5 Take 100 mg by mouth daily. 100 mg  
    
  
   
   
   
  
 LYRICA 300 mg capsule Generic drug:  pregabalin Your last dose was:  LAST NIGHT 12/3 Your next dose is: Take tonight 12/4 Take 300 mg by mouth nightly. 300 mg  
    
   
   
   
  
  
 metFORMIN  mg tablet Commonly known as:  GLUCOPHAGE XR Your last dose was: Last evening 12/3 Your next dose is: This evening 12/4 Take 500 mg by mouth daily (with dinner). 500 mg  
    
   
   
  
   
  
 pravastatin 40 mg tablet Commonly known as:  PRAVACHOL Your last dose was:  LAST NIGHT 12/3 Your next dose is: Take tonight 12/4 Take 40 mg by mouth nightly. 40 mg  
    
   
   
   
  
  
 VITAMIN D3 1,000 unit Cap Generic drug:  cholecalciferol Your next dose is:  Tomorrow morning 12/5 Take 1,000 Units by mouth daily. 1000 Units Where to Get Your Medications Information on where to get these meds will be given to you by the nurse or doctor. ! Ask your nurse or doctor about these medications HYDROcodone-acetaminophen 7.5-325 mg per tablet

## 2017-11-30 NOTE — INTERVAL H&P NOTE
H&P Update:  Phylicia Iqbal was seen and examined. History and physical has been reviewed. The patient has been examined.  There have been no significant clinical changes since the completion of the originally dated History and Physical.    Signed By: Juliet Anton MD     November 30, 2017 1:05 PM

## 2017-11-30 NOTE — OP NOTES
@4MMXD@   [facility] [id number]  [id number]    1950  75 y.o.  female       Operative Report    Date of Surgery: 11/30/2017     Preoperative Diagnosis: Lumbar spinal stenosis [M48.061]     Postoperative Diagnosis: Lumbar spinal stenosis [M48.061]     Surgeon(s) and Role:     * Reji Penn MD - Primary    Anesthesia: General     Procedure:   1. L3-L4 total facetectomies, laminectomy, and foraminotomies for decompression of nerves. 2. Left L3-L4 transfacet lumbar interbody fusion with local autograft, BMP, and interbody cage (Staxx XD, Spine Wave). 3. [place] of local autograft. 4. Revision of existing hardware with placement of new Legacy (Medtronic) pedicle screws at L3 bilaterally and [place] (Spine Wave) hooks. 5. Intraoperative fluoroscopic guidance. Procedure in Detail:   After appropriate informed consent was obtained, the patient was taken to the operating suite where satisfactory anesthesia was induced. The patient was then turned into the prone position on the operating table on chest rolls. All pressure points were carefully padded. Perioperative antibiotics were given. The thoracolumbar region was prepped and draped in the usual sterile fashion. Intraoperative fluoroscopy was used to localize the L3-L4 levels. The skin was infiltrated with 1% Lidocaine with epinephrine. A vertical linear incision was then made in the midline extending from L2 to L5. Dissection was carried down through the subcutaneous tissue. The fascia was incised longitudinally. The spinous processes were identified. Intraoperative fluoroscopy confirmed the appropriate levels. The paraspinal muscles were swept laterally away from the bony elements of the spine on both sides. Dissection was carried out laterally over the transverse processes on both sides. The top half of the existing pedicle screw hardware was exposed on both sides. Meticulous hemostasis was obtained.   Self-retaining retractors were placed in the wound. The wound was then irrigated copiously with antibiotic solution. Next, entry points for pedicle screws at L3 were identified using intraoperative fluoroscopy. Divots were placed at each of these entry points using the 538 Larry drill. Jemison were driven down each of these pedicles under fluoroscopic guidance. These guide holes were then tapped, again under fluoroscopic guidance. Each of these holes was then probed, and good solid bone was found on all four sides of each hole. Pedicle screws of appropriate size were then driven down each pedicle under fluoroscopic guidance. The bony purchase at each level on each side was found to be satisfactory. Next, the wound was irrigated once more copiously with antibiotic solution. Meticulous hemostasis was obtained. The [place] drill and [place] rongeur were used to perform a total laminectomy and total facetectomies at L3. Bone was harvested during this part of the procedure for use as local autograft later in the case. The [place] rongeur was used to remove the thickened underlying ligament flavum with care being given to protection of the nerve roots and the thecal sac. The left L3 and L4 nerve roots were both identified and were mobilized. Epidural veins were coagulated and sharply divided. The pedicles were identified both superiorly and inferiorly. The L3-L4 disc space was identified and was incised sharply. Disc material was removed from the disc space in a piecemeal fashion using the pituitary rongeurs. The nerves were inspected and were found to be completely decompressed. Next, the sequential distractors were inserted into the disc space under fluoroscopic guidance. Additional disc material was removed. The end plates were carefully prepared using reverse angle curettes and pituitary rongeurs.  Once the disc space had been completely cleaned of disc material and cartilaginous end plate, local autograft harvested earlier in the case was packed into the disc space far to the right side. BMP sponges which had been prepared at the beginning of the case were also packed into the disc space with the autograft. Next, a Staxx XD cage was brought up to the field and soaked in antibiotic solution. The cage was carefully tapped into the disc space under fluoroscopic guidance. The cage was expanded appropriately. Intraoperative fluoroscopy confirmed excellent cage placement. Generous foraminotomies were then carried out over the exiting and traversing nerve roots on the right side. The wound was then irrigated copiously with antibiotic solution. [place] rods of appropriate size were then settled into the pedicle screw heads. Set screws were then tightened down into the construct, securing the rods in place. The [place] devices hooked the new rods to the existing rods bilaterally. The self-retaining retractors were removed from the wound. Meticulous hemostasis was obtained. A 10 Pashto round fluted Callum drain was placed in the wound and was brought out through a separate stab incision. The fascia was then closed using interrupted 0 Vicryl sutures. The subcutaneous tissue was closed in a multi-layered fashion. The skin edges were approximated using Dermabond. The drain was hooked to bulb suction and was secured to the skin using a suture. A sterile dressing was applied overall. The patient was then turned back into the supine position on the stretcher were satisfactory anesthesia was reversed. The patient was then taken to the recovery room in satisfactory condition. Estimated Blood Loss:   75cc    Implants:   Implant Name Type Inv.  Item Serial No.  Lot No. LRB No. Used Action   GRAFT BNE RECOMB HUM INFUSE XS --  - [id number]  GRAFT BNE RECOMB HUM INFUSE XS --   MEDTRONIC SOFAMOR DANEK [id number] N/A 1 Implanted   SCR SPNE MA 5.5 LEG M8 6.5X50 -- TI - [id number]  SCR SPNE MA 5.5 LEG M8 6.5X50 -- TI  MEDTRONIC SOFAMOR CARMELO CASAREZ [facility] [id number] N/A 2 Implanted   set screw    MEDALYSSIA Bahena [id number] N/A 2 Implanted   CAGE SPNE CONVX EXP LP 22X9X7 -- STAXX XD W/TANTALUM MARKER - [id number]  CAGE SPNE CONVX EXP LP [id number] -- STAXX XD W/TANTALUM MARKER  SPINEWAVE [id number] N/A 1 Implanted   half jog 57 [facility] [id number] N/A 1 Implanted   no jog 45     SPINE WAVE INC [id number] N/A 1 Implanted   SCR CAP LCK ANNEX T30 --  - [id number]   SCR CAP LCK ANNEX T30 --    SPINEWAVE [id number] N/A 2 Implanted               Specimens: * No specimens in log *        Drains: JACOBY x 1 to lumbar region                Complications: None    Counts: Sponge and needle counts were correct times two.     Signed By:  Claudean Getting, MD     November 30, 2017

## 2017-11-30 NOTE — H&P
History of Present Illness        Note: Patient is a 75 y.o. female who is being seen at the request of Francisco Bond MD for evaluation of low back pain. Patient had prior neck and low back surgery done in the remote past by Dr Jenifer Jackson. Patient since followed with Dr Orly Ribeiro  But returns with questions since she was not happy with the direction of her care. Patient reports symptoms have been progressive. She initially did well after her back surgery but started developing a stooped posture with the inability to stand up straight. Patient states the pain in her low back is not bad but \"she just wants to be able to walk. \"  States limited ability to ambulate. Pain is dull and located in her low back without radiation at rest but her legs quickly gets weak when she ambulates. Pain increased by walking and relieved by sitting. Patient reports associated bilateral lower extremity weakness and now requires a cane. She reports gait instability but denies bowel or bladder dysfunction. Patient has had the following interventions epidural steroid injection without benefit. She has not had any physical therapy. For pain, patient is currently taking Lyrica and Mobic.             Allergies   Allergen Reactions    No Known Allergies Other (comments)              Past Medical History:   Diagnosis Date    Arthritis       degenerative    Chronic pain      Diabetes (HCC)       type 2 blood sugars average-     Hypercholesterolemia      Hypertension      Psychiatric disorder       anxiety         Social History            Social History    Marital status:        Spouse name: N/A    Number of children: N/A    Years of education: N/A          Occupational History    Not on file.            Social History Main Topics    Smoking status: Never Smoker    Smokeless tobacco: Never Used    Alcohol use No    Drug use:  Yes       Special: Prescription    Sexual activity: Not on file           Other Topics Concern    Not on file      Social History Narrative                Current Outpatient Prescriptions   Medication Sig Dispense Refill    pravastatin (PRAVACHOL) 40 mg tablet Take 40 mg by mouth nightly.        meloxicam (MOBIC) 15 mg tablet Take 15 mg by mouth daily.        losartan (COZAAR) 100 mg tablet Take 100 mg by mouth daily.        pregabalin (LYRICA) 300 mg capsule Take 25 mg by mouth two (2) times a day.        alprazolam (XANAX) 1 mg tablet Take 1 mg by mouth as needed for Sleep.        metformin ER (GLUCOPHAGE XR) 500 mg tablet Take  by mouth daily (with dinner).                    Family History   Problem Relation Age of Onset    Lung Disease Mother      Cancer Father         kidney    Diabetes Brother      Heart Disease Brother           Past Surgical History:   Procedure Laterality Date    HX BACK SURGERY        HX CERVICAL FUSION        HX HYSTERECTOMY        HX KNEE REPLACEMENT        HX ORTHOPAEDIC         right carpal tunnel    HX TUBAL LIGATION             Review of Systems     A complete review of systems was done and is as stated in the history of present illness or otherwise negative.         Vitals:     07/11/17 0900   BP: 128/72   Temp: 98.2 °F (36.8 °C)   Weight: 186 lb (84.4 kg)   PainSc:   0 - No pain             Physical Exam:   General:  The patient is well developed, well nourished, provides a coherent history and is in no acute distress. Eyes:  Conjunctivae/corneas clear. PERRL, EOMs intact. Mouth/Throat: Lips, mucosa, and tongue normal. Teeth and gums normal.   Neck: Supple, symmetrical, trachea midline, no adenopathy, thyroid: no enlargment/tenderness/nodules, and no JVD. Back:   Symmetric, stooped curvature. ROM normal. Mild tenderness in lower lumbar region. Lungs:   Clear to auscultation bilaterally. Heart:  Regular rate and rhythm, S1, S2 normal, no murmur   Abdomen:   Soft, non-tender. Bowel sounds normal. No masses,  No organomegaly.    Extremities: Extremities normal, atraumatic, no cyanosis or edema. Pulses: 2+ and symmetric all extremities. Skin: Skin color, texture, turgor normal. No rashes or lesions   Lymph nodes: Cervical, supraclavicular, and axillary nodes normal.         Cranial Nerves:   Cranial nerves intact, EOM's full, no nystagmus, no ptosis. Facial sensation is normal. Facial movement is symmetric. Hearing is normal bilaterally. Palate is midline with normal sternocleidomastoid and trapezius muscles are normal. Tongue is midline. Motor:  5/5 strength in right and 5-/5 left upper extremities. 3+ in left and right lower proximal and distal muscles. Normal bulk and tone. No fasciculations. Reflexes:   Deep tendon reflexes diminished and symmetrical.    Sensory:   Normal to touch, pinprick and vibration. Gait:  Limps and hobbles. Tremor:   No tremor noted. Cerebellar:  No cerebellar signs present.                            Imaging:     lumbar Spine MRI images reviewed by myself and discussed with patient. Evidence of prior surgery with fusion of L4-S1. At L3-4, just above the fusion, there is severe spinal stenosis which is most likely the source of patient's symptoms.        Assessment & Plan     Patient presents with low back pain with gait instability and associated neurogenic claudication. Even with physical therapy symptoms are most likely to persists due to the etiology. Will have patient follow up with Dr Mercy Eric for discussion of surgical options. Get lumbar flexion and extension x-rays to check for spine stability.        1. Acute midline low back pain without sciatica     - XR SPINE LUMB MIN 4 V; Future     2. Neurogenic claudication due to lumbar spinal stenosis     - XR SPINE LUMB MIN 4 V; Future     3. Spinal stenosis, lumbar    The patient is a 75 y.o. female who returns with a long H/O low back problems. She underwent an L4-5 and L5-S1 ALIF by me more than 10 years ago. She did very well with this.   Over the last 4-5 years, she has developed gradually progressive deep achy LBP that is worst with walking even a short distance. She flexes forward and feels that her back is very weak. She has no paresthesias with this. She is now relying on a cane to get around, but she is still very limited even with this. She is on Mobic, which is not helping much. She has been through ESIs, which have not helped. She is in too much pain to do PT.             Visit Vitals    /72    Temp 99 °F (37.2 °C)    Ht 5' 2\" (1.575 m)    Wt 187 lb (84.8 kg)    BMI 34.2 kg/m2            Physical Exam  The physical exam findings are as follows:  Cranial Nerves:   Intact visual fields. Fundi are benign. IVON, EOM's full, no nystagmus, no ptosis. Facial sensation is normal. Corneal reflexes are intact. Facial movement is symmetric. Hearing is normal bilaterally. Palate is midline with normal sternocleidomastoid and trapezius muscles are normal. Tongue is midline. Motor:  5/5 strength in upper and lower proximal and distal muscles. Normal bulk and tone. No fasciculations. Reflexes:   Deep tendon reflexes 2+/4 and symmetrical. No pathologic reflexes present. Sensory:   Normal to touch, pinprick and vibration. Gait:  Very antalgic, severely flexed forward   Tremor:   No tremor noted. Cerebellar:  No cerebellar signs present. GCS15, A&Ox3.      Assessment & Plan  The new MRI L-spine reveals satisfactory postop change at L4-S1. There is very severe stenosis at L3-4 and mild stenosis at L2-3.     The upright dynamic L-spine films do not reveal any abnormal motion.     I do suspect the above findings are the etiology of her symptoms. This problem is severe and getting worse. The only viable treatment option is surgery. She wishes to proceed with surgery, which will involve a L L3-4 open TLIF with PEEK cage, BMP, local autograft, and revision of hardware. She understands the nature of the procedure and the risks and benefits.   We will set this up at the earliest convenience.

## 2017-12-01 LAB
GLUCOSE BLD STRIP.AUTO-MCNC: 121 MG/DL (ref 65–100)
GLUCOSE BLD STRIP.AUTO-MCNC: 146 MG/DL (ref 65–100)
GLUCOSE BLD STRIP.AUTO-MCNC: 146 MG/DL (ref 65–100)
GLUCOSE BLD STRIP.AUTO-MCNC: 209 MG/DL (ref 65–100)

## 2017-12-01 PROCEDURE — 77010033678 HC OXYGEN DAILY

## 2017-12-01 PROCEDURE — 94760 N-INVAS EAR/PLS OXIMETRY 1: CPT

## 2017-12-01 PROCEDURE — 82962 GLUCOSE BLOOD TEST: CPT

## 2017-12-01 PROCEDURE — 65270000029 HC RM PRIVATE

## 2017-12-01 PROCEDURE — 74011250636 HC RX REV CODE- 250/636: Performed by: NEUROLOGICAL SURGERY

## 2017-12-01 PROCEDURE — 97161 PT EVAL LOW COMPLEX 20 MIN: CPT

## 2017-12-01 PROCEDURE — 97530 THERAPEUTIC ACTIVITIES: CPT

## 2017-12-01 PROCEDURE — 74011250637 HC RX REV CODE- 250/637: Performed by: NEUROLOGICAL SURGERY

## 2017-12-01 RX ADMIN — OXYCODONE HYDROCHLORIDE AND ACETAMINOPHEN 2 TABLET: 5; 325 TABLET ORAL at 20:06

## 2017-12-01 RX ADMIN — PRAVASTATIN SODIUM 40 MG: 20 TABLET ORAL at 21:20

## 2017-12-01 RX ADMIN — OXYCODONE HYDROCHLORIDE AND ACETAMINOPHEN 2 TABLET: 5; 325 TABLET ORAL at 08:17

## 2017-12-01 RX ADMIN — OXYCODONE HYDROCHLORIDE AND ACETAMINOPHEN 2 TABLET: 5; 325 TABLET ORAL at 11:58

## 2017-12-01 RX ADMIN — STANDARDIZED SENNA CONCENTRATE AND DOCUSATE SODIUM 2 TABLET: 8.6; 5 TABLET, FILM COATED ORAL at 08:17

## 2017-12-01 RX ADMIN — FAMOTIDINE 20 MG: 20 TABLET, FILM COATED ORAL at 08:18

## 2017-12-01 RX ADMIN — HEPARIN SODIUM 5000 UNITS: 5000 INJECTION, SOLUTION INTRAVENOUS; SUBCUTANEOUS at 08:19

## 2017-12-01 RX ADMIN — HEPARIN SODIUM 5000 UNITS: 5000 INJECTION, SOLUTION INTRAVENOUS; SUBCUTANEOUS at 21:20

## 2017-12-01 RX ADMIN — ALPRAZOLAM 0.5 MG: 0.5 TABLET ORAL at 01:33

## 2017-12-01 RX ADMIN — ALPRAZOLAM 1 MG: 0.5 TABLET ORAL at 21:20

## 2017-12-01 RX ADMIN — FAMOTIDINE 20 MG: 20 TABLET, FILM COATED ORAL at 21:20

## 2017-12-01 RX ADMIN — MORPHINE SULFATE 10 MG: 10 INJECTION, SOLUTION INTRAMUSCULAR; INTRAVENOUS at 01:33

## 2017-12-01 RX ADMIN — PREGABALIN 300 MG: 150 CAPSULE ORAL at 21:20

## 2017-12-01 RX ADMIN — Medication 5 ML: at 21:21

## 2017-12-01 RX ADMIN — METFORMIN HYDROCHLORIDE 500 MG: 500 TABLET, FILM COATED ORAL at 16:47

## 2017-12-01 NOTE — PROGRESS NOTES
TRANSFER - IN REPORT:    Verbal report received from Cierra Chaudhari, [facility], [facility] on Lisa Blackman  being received from PACU for routine progression of care      Report consisted of patients Situation, Background, Assessment and   Recommendations(SBAR). Information from the following report(s) SBAR, OR Summary, MAR, Med Rec Status and Cardiac Rhythm ns was reviewed with the receiving nurse. Opportunity for questions and clarification was provided. Assessment completed upon patients arrival to unit and care assumed.

## 2017-12-01 NOTE — ANESTHESIA POSTPROCEDURE EVALUATION
Post-Anesthesia Evaluation and Assessment    Patient: Prince Scruggs MRN: [id number]  SSN: [id number]    YOB: 1950  Age: 75 y.o. Sex: female       Cardiovascular Function/Vital Signs  Visit Vitals    /84    Pulse 67    Temp 36.8 °C (98.2 °F)    Resp 18    Ht 5' 2\" (1.575 m)    Wt 83.6 kg (184 lb 3.2 oz)    SpO2 98%    BMI 33.69 kg/m2       Patient is status post general anesthesia for Procedure(s):  LEFT L3- L4 OPEN TLIF WITH REVISION OF HARDWARE. Nausea/Vomiting: None    Postoperative hydration reviewed and adequate. Pain:  Pain Scale 1: Numeric (0 - 10) (11/30/17 1815)  Pain Intensity 1: 0 (11/30/17 1815)   Managed    Neurological Status:   Neuro (WDL): Within Defined Limits (11/30/17 1815)  Neuro  LUE Motor Response: Purposeful (11/30/17 1815)  LLE Motor Response: Purposeful (11/30/17 1815)  RUE Motor Response: Purposeful (11/30/17 1815)  RLE Motor Response: Purposeful (11/30/17 1815)   At baseline    Mental Status and Level of Consciousness: Arousable    Pulmonary Status:   O2 Device: Nasal cannula (11/30/17 1815)   Adequate oxygenation and airway patent    Complications related to anesthesia: None    Post-anesthesia assessment completed.  No concerns    Signed By: Rosenda De La Paz MD     November 30, 2017

## 2017-12-01 NOTE — PROGRESS NOTES
Problem: Mobility Impaired (Adult and Pediatric)  Goal: *Acute Goals and Plan of Care (Insert Text)  LTG:  (1.)Ms. Severo Spaniel will move from supine to sit and sit to supine and roll side to side, using log roll technique, with MODIFIED INDEPENDENCE within 7 day(s). (2.)Ms. Severo Spaniel will transfer from bed to chair and chair to bed with MODIFIED INDEPENDENCE using the least restrictive device within 7 day(s). (3.)Ms. Severo Spaniel will ambulate with MODIFIED INDEPENDENCE for 500 feet with the least restrictive device within 7 day(s). (4.)Ms. Severo Spaniel will ascend and descend 4 stairs using B hand rail(s) with SUPERVISION to improve functional mobility and safety within 7 day(s). ________________________________________________________________________________________________      PHYSICAL THERAPY: Initial Assessment, Treatment Day: Day of Assessment, AM 12/1/2017  INPATIENT: Hospital Day: 2  Payor: Vladimir Pandey / Plan: [address] [place] [address] HMO / Product Type: Managed Care Medicare /    Spinal brace and spinal precautions     NAME/AGE/GENDER: Virginia Guzman is a 75 y.o. female   PRIMARY DIAGNOSIS: Lumbar spinal stenosis [M48.061] <principal problem not specified> <principal problem not specified>  Procedure(s) (LRB):  LEFT L3- L4 OPEN TLIF WITH REVISION OF HARDWARE (N/A)  1 Day Post-Op  ICD-10: Treatment Diagnosis:   · Generalized Muscle Weakness (M62.81)  · Other abnormalities of gait and mobility (R26.89)   Precaution/Allergies:  No known allergies      ASSESSMENT:     Ms. Severo Spaniel is a 75 y.o. female in the hospital for the above who was supine in bed upon arrival and agreeable to PT. She reports that she lives with her  and family in a single story home with steps to enter. She also stated that PTA she was independent with ADLs but used a straight cane for ambulation with recent history of falls. Pt educated on post op precautions and log roll technique.   Ms. Severo Spaniel present to PT with weakness in B hip flexors and L dorsiflexors but WFL AROM. She also reported diminished sensation in B L4-S2 dermatomes. She was able to come to sitting on edge of bed with min assist for rolling. Pt stood with Lianne and demonstrates fair standing balance. She ambulated with RW and CGA-SBA with decreased gait speed and step clearance. In addition to evaluation pt received treatment consisting of therapeutic activities. She was performed several STS transfers with RW as well as BSC transfer, standing activities, and ambulating in halls. Pt tends to ambulated with head down and hips flexed but corrected with verbal cuing. Ms. Ena De Anda could benefit from skilled PT as she appears to be functioning below her baseline. Pt also requested BSC for home use. This section established at most recent assessment   PROBLEM LIST (Impairments causing functional limitations):  1. Decreased Strength  2. Decreased Transfer Abilities  3. Decreased Ambulation Ability/Technique  4. Decreased Balance   INTERVENTIONS PLANNED: (Benefits and precautions of physical therapy have been discussed with the patient.)  1. Balance Exercise  2. Bed Mobility  3. Family Education  4. Gait Training  5. Neuromuscular Re-education/Strengthening  6. Therapeutic Activites  7. Therapeutic Exercise/Strengthening  8. Transfer Training  9. Group Therapy     TREATMENT PLAN: Frequency/Duration: twice daily for duration of hospital stay  Rehabilitation Potential For Stated Goals: Good     RECOMMENDED REHABILITATION/EQUIPMENT: (at time of discharge pending progress): Due to the probability of continued deficits (see above) this patient will not likely need continued skilled physical therapy after discharge.   Equipment:   [employer], Type: Commode              HISTORY:   History of Present Injury/Illness (Reason for Referral):  S/P: LEFT L3- L4 OPEN TLIF WITH REVISION OF HARDWARE (N/A)  Past Medical History/Comorbidities:   Ms. Ena De Anda  has a past medical history of Anxiety; Chronic pain; Depression; Diabetes mellitus, type 2 ([facility] GERD (gastroesophageal reflux disease); UTI (urinary tract infection); Hypercholesterolemia; Hypertension; Morbid obesity ([facility] [address] [facility] and OA (osteoarthritis). She also has no past medical history of Difficult intubation; Malignant hyperthermia due to anesthesia; Nausea & vomiting; or Pseudocholinesterase deficiency. Ms. Dorota Todd  has a past surgical history that includes tubal ligation; hysterectomy; cervical fusion; orthopaedic; knee replacement; and appendectomy. Social History/Living Environment:   Home Environment: Private residence  # Steps to Enter: 4  Rails to Enter: Yes  Hand Rails : Bilateral  One/Two Story Residence: One story  Living Alone: No  Support Systems: Spouse/Significant Other/Partner  Patient Expects to be Discharged to[de-identified] Unknown  Current DME Used/Available at Home: Cane, straight, Walker, rolling, Commode, bedside  Tub or Shower Type: Shower  Prior Level of Function/Work/Activity:  Lives with  and family in one story home with steps to enter. Independent with ADLs but uses cane for ambulation with recent falls. Number of Personal Factors/Comorbidities that affect the Plan of Care:  Pain  Depression  Diabetes  Falls 3+: HIGH COMPLEXITY   EXAMINATION:   Most Recent Physical Functioning:   Gross Assessment:  AROM: Within functional limits  Strength: Generally decreased, functional (B hip flexors and L dorsiflexion 4-/5)  Sensation: Impaired (B L4-S2 decreased)               Posture:  Posture (WDL): Exceptions to WDL  Posture Assessment:  Forward head  Balance:  Sitting: Intact  Standing: Impaired  Standing - Static: Fair  Standing - Dynamic : Fair Bed Mobility:  Rolling: Minimum assistance  Supine to Sit: Stand-by asssistance  Wheelchair Mobility:     Transfers:  Sit to Stand: Minimum assistance  Stand to Sit: Contact guard assistance  Bed to Chair: Minimum assistance  Gait:     Speed/Sally: Slow;Shuffled  Step Length: Right shortened;Left shortened  Gait Abnormalities: Decreased step clearance  Distance (ft): 60 Feet (ft)  Assistive Device: Walker, rolling  Ambulation - Level of Assistance: Contact guard assistance;Stand-by asssistance      Body Structures Involved:  1. Nerves  2. Bones  3. Muscles Body Functions Affected:  1. Sensory/Pain  2. Neuromusculoskeletal  3. Movement Related Activities and Participation Affected:  1. General Tasks and Demands  2. Mobility  3. Self Care  4. Domestic Life  5. Community, Social and [place]   Number of elements that affect the Plan of Care: 4+: HIGH COMPLEXITY   CLINICAL PRESENTATION:   Presentation: Stable and uncomplicated: LOW COMPLEXITY   CLINICAL DECISION MAKIN [facility] Mobility Inpatient Short Form  How much difficulty does the patient currently have. .. Unable A Lot A Little None   1. Turning over in bed (including adjusting bedclothes, sheets and blankets)? [] 1   [] 2   [x] 3   [] 4   2. Sitting down on and standing up from a chair with arms ( e.g., wheelchair, bedside commode, etc.)   [] 1   [] 2   [x] 3   [] 4   3. Moving from lying on back to sitting on the side of the bed? [] 1   [] 2   [x] 3   [] 4   How much help from another person does the patient currently need. .. Total A Lot A Little None   4. Moving to and from a bed to a chair (including a wheelchair)? [] 1   [] 2   [x] 3   [] 4   5. Need to walk in hospital room? [] 1   [] 2   [x] 3   [] 4   6. Climbing 3-5 steps with a railing? [] 1   [] 2   [x] 3   [] 4   © , Trustees of [facility] [employer], under license to [employer]. All rights reserved      Score:  Initial: 18 Most Recent: X (Date: -- )    Interpretation of Tool:  Represents activities that are increasingly more difficult (i.e. Bed mobility, Transfers, Gait). Score 24 23 22-20 19-15 14-10 9-7 6     Modifier CH CI CJ CK CL CM CN      ?  Mobility - Walking and Moving Around:     - CURRENT STATUS: CK - 40%-59% impaired, limited or restricted    - GOAL STATUS: CJ - 20%-39% impaired, limited or restricted    - D/C STATUS:  ---------------To be determined---------------  Payor: Carmela Baptiste / Plan: [facility] / Product Type: Managed Care Medicare /      Medical Necessity:     · Patient demonstrates good rehab potential due to higher previous functional level. Reason for Services/Other Comments:  · Patient continues to require skilled intervention due to decreased functional mobility and balance. Use of outcome tool(s) and clinical judgement create a POC that gives a: Clear prediction of patient's progress: LOW COMPLEXITY            TREATMENT:   (In addition to Assessment/Re-Assessment sessions the following treatments were rendered)   Pre-treatment Symptoms/Complaints:  Post op back pain  Pain: Initial:   Pain Intensity 1: 4  Pain Location 1: Back  Pain Orientation 1: Lower  Post Session:  4/10     Therapeutic Activity: (    8 minutes): Therapeutic activities including Chair transfers, [facility] transfers, Ambulation on level ground and standing activities to improve mobility, strength and balance. Required minimal   to promote static and dynamic balance in standing and proper transfer techniques with use of RW. Braces/Orthotics/Lines/Etc:   · Spinal brace  Treatment/Session Assessment:    · Response to Treatment:  Pt tolerated well with no increase in pain. · Interdisciplinary Collaboration:   o Physical Therapist  o Registered Nurse  o Certified Nursing Assistant/Patient Care Technician  · After treatment position/precautions:   o Up in chair  o Call light within reach  o Visitors at bedside   · Compliance with Program/Exercises: Will assess as treatment progresses. · Recommendations/Intent for next treatment session: \"Next visit will focus on advancements to more challenging activities and reduction in assistance provided\".   Total Treatment Duration:  PT Patient Time In/Time Out  Time In: 5214  Time Out: Dianna 25 Rubia Hitchcock DPT

## 2017-12-01 NOTE — PROGRESS NOTES
Spiritual Care visit. Initial Visit     visited with patient and family members. Prayed for Patient's health and wellbeing.     Visit by Robin Zapata M.Ed., Th.B. ,Staff

## 2017-12-01 NOTE — PROGRESS NOTES
Pt doing well, no complaints. Eating, voiding, not yet mobilizing. AFVSS  Neuro intact  Wound OK  JACOBY removed due to hole created in tubing during dressing change    Stable, mobilize today.

## 2017-12-01 NOTE — PERIOP NOTES
TRANSFER - OUT REPORT:    Verbal report given to [employer] on Katherin Asencio  being transferred to [address] [place] [address] for routine post - op       Report consisted of patients Situation, Background, Assessment and   Recommendations(SBAR). Information from the following report(s) SBAR, OR Summary, Procedure Summary, Intake/Output and MAR was reviewed with the receiving nurse. Lines:   Peripheral IV 11/30/17 Right Wrist (Active)   Site Assessment Clean, dry, & intact 11/30/2017  6:15 PM   Phlebitis Assessment 0 11/30/2017  6:15 PM   Infiltration Assessment 0 11/30/2017  6:15 PM   Dressing Status Clean, dry, & intact; Occlusive 11/30/2017  6:15 PM   Dressing Type Transparent;Tape 11/30/2017  6:15 PM   Hub Color/Line Status Green;Capped 11/30/2017  6:15 PM   Alcohol Cap Used No 11/30/2017  6:15 PM       Peripheral IV Left Hand (Active)   Site Assessment Clean, dry, & intact 11/30/2017  6:15 PM   Phlebitis Assessment 0 11/30/2017  6:15 PM   Infiltration Assessment 0 11/30/2017  6:15 PM   Dressing Status Clean, dry, & intact; Occlusive 11/30/2017  6:15 PM   Dressing Type Transparent;Tape 11/30/2017  6:15 PM   Hub Color/Line Status Pink; Infusing 11/30/2017  6:15 PM   Alcohol Cap Used No 11/30/2017  6:15 PM        Opportunity for questions and clarification was provided. Patient transported with:   O2 @ 2 liters    VTE prophylaxis orders have been written for Katherin Asencio. Patient and family given floor number and nurses name. Family updated re: pt status after security code verified.

## 2017-12-01 NOTE — PROGRESS NOTES
Problem: Mobility Impaired (Adult and Pediatric)  Goal: *Acute Goals and Plan of Care (Insert Text)  LTG:  (1.)Ms. Barbara Carrera will move from supine to sit and sit to supine and roll side to side, using log roll technique, with MODIFIED INDEPENDENCE within 7 day(s). (2.)Ms. Barbara Carrera will transfer from bed to chair and chair to bed with MODIFIED INDEPENDENCE using the least restrictive device within 7 day(s). (3.)Ms. Barbara Carrera will ambulate with MODIFIED INDEPENDENCE for 500 feet with the least restrictive device within 7 day(s). (4.)Ms. Barbara Carrera will ascend and descend 4 stairs using B hand rail(s) with SUPERVISION to improve functional mobility and safety within 7 day(s). ________________________________________________________________________________________________      PHYSICAL THERAPY: Daily Note, Treatment Day: Day of Assessment, PM 12/1/2017  INPATIENT: Hospital Day: 2  Payor: Esa Serna / Plan: [address] [place] [address] HMO / Product Type: Managed Care Medicare /    Spinal brace and spinal precautions     NAME/AGE/GENDER: Venus Gupta is a 75 y.o. female   PRIMARY DIAGNOSIS: Lumbar spinal stenosis [M48.061] <principal problem not specified> <principal problem not specified>  Procedure(s) (LRB):  LEFT L3- L4 OPEN TLIF WITH REVISION OF HARDWARE (N/A)  1 Day Post-Op  ICD-10: Treatment Diagnosis:   · Generalized Muscle Weakness (M62.81)  · Other abnormalities of gait and mobility (R26.89)   Precaution/Allergies:  No known allergies      ASSESSMENT:     Ms. Barbara Carrera was supine in bed but agree to PT upon arrival.  Pt able to perform log roll with supervision and verbal cuing. She was given supervision for numerous STS transfers both with and without RW. Pt assisted with donning of brace and given CGA-SBA for toilet transfers and ambulation. Ms. Barbara Carrera also participated in some standing activities with use of RW. She has progressed in both functional mobility and ambulation. Will continue POC.     This section established at most recent assessment   PROBLEM LIST (Impairments causing functional limitations):  1. Decreased Strength  2. Decreased Transfer Abilities  3. Decreased Ambulation Ability/Technique  4. Decreased Balance   INTERVENTIONS PLANNED: (Benefits and precautions of physical therapy have been discussed with the patient.)  1. Balance Exercise  2. Bed Mobility  3. Family Education  4. Gait Training  5. Neuromuscular Re-education/Strengthening  6. Therapeutic Activites  7. Therapeutic Exercise/Strengthening  8. Transfer Training  9. Group Therapy     TREATMENT PLAN: Frequency/Duration: twice daily for duration of hospital stay  Rehabilitation Potential For Stated Goals: Good     RECOMMENDED REHABILITATION/EQUIPMENT: (at time of discharge pending progress): Due to the probability of continued deficits (see above) this patient will not likely need continued skilled physical therapy after discharge. Equipment:   [employer], Type: Commode              HISTORY:   History of Present Injury/Illness (Reason for Referral):  S/P: LEFT L3- L4 OPEN TLIF WITH REVISION OF HARDWARE (N/A)  Past Medical History/Comorbidities:   Ms. Jefe Hunter  has a past medical history of Anxiety; Chronic pain; Depression; Diabetes mellitus, type 2 ([address] GERD (gastroesophageal reflux disease); UTI (urinary tract infection); Hypercholesterolemia; Hypertension; Morbid obesity ([address] and OA (osteoarthritis). She also has no past medical history of Difficult intubation; Malignant hyperthermia due to anesthesia; Nausea & vomiting; or Pseudocholinesterase deficiency. Ms. Jefe Hunter  has a past surgical history that includes tubal ligation; hysterectomy; cervical fusion; orthopaedic; knee replacement; and appendectomy.   Social History/Living Environment:   Home Environment: Private residence  # Steps to Enter: 4  Rails to Enter: Yes  Hand Rails : Bilateral  One/Two Story Residence: One story  Living Alone: No  Support Systems: Spouse/Significant Other/Partner  Patient Expects to be Discharged to[de-identified] Private residence  Current DME Used/Available at Home: Girtha Sarina, straight, Walker, rolling, Commode, bedside  Tub or Shower Type: Shower  Prior Level of Function/Work/Activity:  Lives with  and family in one story home with steps to enter. Independent with ADLs but uses cane for ambulation with recent falls. Number of Personal Factors/Comorbidities that affect the Plan of Care:  Pain  Depression  Diabetes  Falls 3+: HIGH COMPLEXITY   EXAMINATION:   Most Recent Physical Functioning:   Gross Assessment:  AROM: Within functional limits  Strength: Generally decreased, functional (B hip flexors and L dorsiflexion 4-/5)  Sensation: Impaired (B L4-S2 decreased)               Posture:  Posture (WDL): Exceptions to WDL  Posture Assessment: Forward head  Balance:  Sitting: Intact  Standing: Impaired  Standing - Static: Good  Standing - Dynamic : Fair Bed Mobility:  Rolling: Stand-by asssistance  Supine to Sit: Stand-by asssistance  Wheelchair Mobility:     Transfers:  Sit to Stand: Supervision  Stand to Sit: Supervision  Bed to Chair: Contact guard assistance;Stand-by asssistance  Interventions: Safety awareness training;Verbal cues; Visual cues  Gait:     Speed/Sally: Slow;Shuffled  Step Length: Right shortened;Left shortened  Gait Abnormalities: Decreased step clearance  Distance (ft): 250 Feet (ft)  Assistive Device: Walker, rolling  Ambulation - Level of Assistance: Contact guard assistance;Stand-by asssistance      Body Structures Involved:  1. Nerves  2. Bones  3. Muscles Body Functions Affected:  1. Sensory/Pain  2. Neuromusculoskeletal  3. Movement Related Activities and Participation Affected:  1. General Tasks and Demands  2. Mobility  3. Self Care  4. Domestic Life  5.  Community, Social and [place]   Number of elements that affect the Plan of Care: 4+: HIGH COMPLEXITY   CLINICAL PRESENTATION:   Presentation: Stable and uncomplicated: LOW COMPLEXITY CLINICAL DECISION MAKIN[address] [place] [id number] AM-PAC 6 Clicks   Basic Mobility Inpatient Short Form  How much difficulty does the patient currently have. .. Unable A Lot A Little None   1. Turning over in bed (including adjusting bedclothes, sheets and blankets)? [] 1   [] 2   [x] 3   [] 4   2. Sitting down on and standing up from a chair with arms ( e.g., wheelchair, bedside commode, etc.)   [] 1   [] 2   [x] 3   [] 4   3. Moving from lying on back to sitting on the side of the bed? [] 1   [] 2   [x] 3   [] 4   How much help from another person does the patient currently need. .. Total A Lot A Little None   4. Moving to and from a bed to a chair (including a wheelchair)? [] 1   [] 2   [x] 3   [] 4   5. Need to walk in hospital room? [] 1   [] 2   [x] 3   [] 4   6. Climbing 3-5 steps with a railing? [] 1   [] 2   [x] 3   [] 4   © 2007, Trustees of [address] [place] [id number], under license to [employer]. All rights reserved      Score:  Initial: 18 Most Recent: X (Date: -- )    Interpretation of Tool:  Represents activities that are increasingly more difficult (i.e. Bed mobility, Transfers, Gait). Score 24 23 22-20 19-15 14-10 9-7 6     Modifier CH CI CJ CK CL CM CN      ? Mobility - Walking and Moving Around:     - CURRENT STATUS: CK - 40%-59% impaired, limited or restricted    - GOAL STATUS: CJ - 20%-39% impaired, limited or restricted    - D/C STATUS:  ---------------To be determined---------------  Payor: Vladimir Pandey / Plan: [address] [place] [address] HMO / Product Type: [employer] Medicare /      Medical Necessity:     · Patient demonstrates good rehab potential due to higher previous functional level. Reason for Services/Other Comments:  · Patient continues to require skilled intervention due to decreased functional mobility and balance.    Use of outcome tool(s) and clinical judgement create a POC that gives a: Clear prediction of patient's progress: LOW COMPLEXITY TREATMENT:   (In addition to Assessment/Re-Assessment sessions the following treatments were rendered)   Pre-treatment Symptoms/Complaints:  Post op back pain  Pain: Initial:   Pain Intensity 1: 0  Pain Location 1: Back  Pain Orientation 1: Lower  Post Session:  4/5      Therapeutic Activity: (    23 minutes): Therapeutic activities including Chair transfers, toilet transfers,bed transfers, Ambulation on level ground and standing activities to improve mobility, strength and balance. Required minimal cuing   to promote static and dynamic balance in standing and proper transfer techniques with use of RW. Date:  12/1/17 Date:   Date:     ACTIVITY/EXERCISE AM PM AM PM AM PM   Sit to stand  1 x 10  1 x 5                                                             B = bilateral; AA = active assistive; A = active; P = passive            Braces/Orthotics/Lines/Etc:   · Spinal brace  Treatment/Session Assessment:    · Response to Treatment:  See above. · Interdisciplinary Collaboration:   o Physical Therapist  · After treatment position/precautions:   o Up in chair  o Call light within reach   · Compliance with Program/Exercises: Will assess as treatment progresses. · Recommendations/Intent for next treatment session: \"Next visit will focus on advancements to more challenging activities and reduction in assistance provided\".   Total Treatment Duration:  PT Patient Time In/Time Out  Time In: 1505  Time Out: Hudson Silva PT, DPT

## 2017-12-01 NOTE — PROGRESS NOTES
Problem: Falls - Risk of  Goal: *Absence of Falls  Document Jan Fall Risk and appropriate interventions in the flowsheet.    Outcome: Progressing Towards Goal  Fall Risk Interventions:  Mobility Interventions: Bed/chair exit alarm, Patient to call before getting OOB, OT consult for ADLs, Communicate number of staff needed for ambulation/transfer, Strengthening exercises (ROM-active/passive), Utilize walker, cane, or other assitive device, PT Consult for assist device competence    Mentation Interventions: Bed/chair exit alarm, Adequate sleep, hydration, pain control, Evaluate medications/consider consulting pharmacy    Medication Interventions: Assess postural VS orthostatic hypotension, Patient to call before getting OOB, Bed/chair exit alarm, Teach patient to arise slowly    Elimination Interventions: Call light in reach, Bed/chair exit alarm, Patient to call for help with toileting needs, Toileting schedule/hourly rounds    History of Falls Interventions: Bed/chair exit alarm, Door open when patient unattended

## 2017-12-01 NOTE — PROGRESS NOTES
Dual skin assessment with Nery Marcus RN. Surgical incision on back. Some redness noted on buttocks. Skin otherwise intact. Pt oriented to room.

## 2017-12-02 LAB
GLUCOSE BLD STRIP.AUTO-MCNC: 120 MG/DL (ref 65–100)
GLUCOSE BLD STRIP.AUTO-MCNC: 132 MG/DL (ref 65–100)
GLUCOSE BLD STRIP.AUTO-MCNC: 149 MG/DL (ref 65–100)
GLUCOSE BLD STRIP.AUTO-MCNC: 154 MG/DL (ref 65–100)

## 2017-12-02 PROCEDURE — 74011250637 HC RX REV CODE- 250/637: Performed by: NEUROLOGICAL SURGERY

## 2017-12-02 PROCEDURE — 65270000029 HC RM PRIVATE

## 2017-12-02 PROCEDURE — 74011250636 HC RX REV CODE- 250/636: Performed by: NEUROLOGICAL SURGERY

## 2017-12-02 PROCEDURE — 97530 THERAPEUTIC ACTIVITIES: CPT

## 2017-12-02 PROCEDURE — 74011636637 HC RX REV CODE- 636/637: Performed by: NEUROLOGICAL SURGERY

## 2017-12-02 PROCEDURE — 82962 GLUCOSE BLOOD TEST: CPT

## 2017-12-02 RX ORDER — HYDROCODONE BITARTRATE AND ACETAMINOPHEN 7.5; 325 MG/1; MG/1
1 TABLET ORAL
Status: DISCONTINUED | OUTPATIENT
Start: 2017-12-02 | End: 2017-12-04 | Stop reason: HOSPADM

## 2017-12-02 RX ORDER — ALPRAZOLAM 0.5 MG/1
0.5 TABLET ORAL
Status: DISCONTINUED | OUTPATIENT
Start: 2017-12-02 | End: 2017-12-04 | Stop reason: HOSPADM

## 2017-12-02 RX ADMIN — PREGABALIN 300 MG: 150 CAPSULE ORAL at 21:47

## 2017-12-02 RX ADMIN — HYDROCODONE BITARTRATE AND ACETAMINOPHEN 1 TABLET: 7.5; 325 TABLET ORAL at 20:36

## 2017-12-02 RX ADMIN — PRAVASTATIN SODIUM 40 MG: 20 TABLET ORAL at 21:47

## 2017-12-02 RX ADMIN — Medication 5 ML: at 21:50

## 2017-12-02 RX ADMIN — ACETAMINOPHEN 650 MG: 325 TABLET ORAL at 10:37

## 2017-12-02 RX ADMIN — HEPARIN SODIUM 5000 UNITS: 5000 INJECTION, SOLUTION INTRAVENOUS; SUBCUTANEOUS at 20:36

## 2017-12-02 RX ADMIN — Medication 5 ML: at 06:02

## 2017-12-02 RX ADMIN — HEPARIN SODIUM 5000 UNITS: 5000 INJECTION, SOLUTION INTRAVENOUS; SUBCUTANEOUS at 10:39

## 2017-12-02 RX ADMIN — STANDARDIZED SENNA CONCENTRATE AND DOCUSATE SODIUM 2 TABLET: 8.6; 5 TABLET, FILM COATED ORAL at 10:37

## 2017-12-02 RX ADMIN — FAMOTIDINE 20 MG: 20 TABLET, FILM COATED ORAL at 10:37

## 2017-12-02 RX ADMIN — INSULIN LISPRO 2 UNITS: 100 INJECTION, SOLUTION INTRAVENOUS; SUBCUTANEOUS at 12:08

## 2017-12-02 RX ADMIN — FAMOTIDINE 20 MG: 20 TABLET, FILM COATED ORAL at 20:36

## 2017-12-02 RX ADMIN — Medication 5 ML: at 14:00

## 2017-12-02 RX ADMIN — METFORMIN HYDROCHLORIDE 500 MG: 500 TABLET, FILM COATED ORAL at 17:13

## 2017-12-02 NOTE — PROGRESS NOTES
Problem: Mobility Impaired (Adult and Pediatric)  Goal: *Acute Goals and Plan of Care (Insert Text)  LTG:  (1.)Ms. Sandi Jasso will move from supine to sit and sit to supine and roll side to side, using log roll technique, with MODIFIED INDEPENDENCE within 7 day(s). (2.)Ms. Sandi Jasso will transfer from bed to chair and chair to bed with MODIFIED INDEPENDENCE using the least restrictive device within 7 day(s). (3.)Ms. Sandi Jasso will ambulate with MODIFIED INDEPENDENCE for 500 feet with the least restrictive device within 7 day(s). (4.)Ms. Sandi Jasso will ascend and descend 4 stairs using B hand rail(s) with SUPERVISION to improve functional mobility and safety within 7 day(s). ________________________________________________________________________________________________      PHYSICAL THERAPY: Daily Note, Treatment Day: 1st, PM 12/2/2017  INPATIENT: Hospital Day: 3  Payor: Kristine Ann / Plan: [address] [place] [address] HMO / Product Type: Managed Care Medicare /      Spinal brace and spinal precautions     NAME/AGE/GENDER: Caitlin Hernandez is a 75 y.o. female   PRIMARY DIAGNOSIS: Lumbar spinal stenosis [M48.061] <principal problem not specified> <principal problem not specified>  Procedure(s) (LRB):  LEFT L3- L4 OPEN TLIF WITH REVISION OF HARDWARE (N/A)  2 Days Post-Op  ICD-10: Treatment Diagnosis:   · Generalized Muscle Weakness (M62.81)  · Other abnormalities of gait and mobility (R26.89)   Precaution/Allergies:  No known allergies      ASSESSMENT:     Ms. Sandi Jasso is a 75year old female 2 days s/p left L3-L4 TLIF with lumbar ASPEN brace and spinal precautions. Patient presents supine in bed, drowsy, and agreeable to PT. Performed rolling with minimal assistance, supine to sitting with moderate assistance, scooting with minimal assistance, and demonstrate fair static and fair- dynamic sitting balance with posterior lean. Maximal assistance to don lumbar brace secondary to sitting balance.  Addressed sitting balance, postural retraining, and pre-gait activity at edge of bed. Performed sit to stand with minimal assistance and transfer bed to chair with minimal assistance and RW. Able to participate in therapeutic exercises noted below while sitting up in bedside chair. Patient more alert at end of treatment session; encouraged to sit up in chair: instructed to call for assistance back to bed; verbalized understanding. RN notified of patient status at end of treatment session and in agreement with plan. Progress limited today by drowsiness compared to patient ambulatory x250ft in hallway yesterday. Will continue with stated POC. This section established at most recent assessment   PROBLEM LIST (Impairments causing functional limitations):  1. Decreased Strength  2. Decreased Transfer Abilities  3. Decreased Ambulation Ability/Technique  4. Decreased Balance   INTERVENTIONS PLANNED: (Benefits and precautions of physical therapy have been discussed with the patient.)  1. Balance Exercise  2. Bed Mobility  3. Family Education  4. Gait Training  5. Neuromuscular Re-education/Strengthening  6. Therapeutic Activites  7. Therapeutic Exercise/Strengthening  8. Transfer Training  9. Group Therapy     TREATMENT PLAN: Frequency/Duration: twice daily for duration of hospital stay  Rehabilitation Potential For Stated Goals: Good     RECOMMENDED REHABILITATION/EQUIPMENT: (at time of discharge pending progress): Due to the probability of continued deficits (see above) this patient will not likely need continued skilled physical therapy after discharge.   Equipment:   [employer], Type: Commode              HISTORY:   History of Present Injury/Illness (Reason for Referral):  S/P: LEFT L3- L4 OPEN TLIF WITH REVISION OF HARDWARE (N/A)  Past Medical History/Comorbidities:   Ms. Vijay Falcon  has a past medical history of Anxiety; Chronic pain; Depression; Diabetes mellitus, type 2 ([facility] [address] GERD (gastroesophageal reflux disease); UTI (urinary tract infection); Hypercholesterolemia; Hypertension; Morbid obesity ([address] and OA (osteoarthritis). She also has no past medical history of Difficult intubation; Malignant hyperthermia due to anesthesia; Nausea & vomiting; or Pseudocholinesterase deficiency. Ms. Zoey Smith  has a past surgical history that includes tubal ligation; hysterectomy; cervical fusion; orthopaedic; knee replacement; and appendectomy. Social History/Living Environment:   Home Environment: Private residence  # Steps to Enter: 4  Rails to Enter: Yes  Hand Rails : Bilateral  One/Two Story Residence: One story  Living Alone: No  Support Systems: Spouse/Significant Other/Partner  Patient Expects to be Discharged to[de-identified] Private residence  Current DME Used/Available at Home: Hortencia Sheller, straight, Walker, rolling, Commode, bedside  Tub or Shower Type: Shower  Prior Level of Function/Work/Activity:  Lives with  and family in one story home with steps to enter. Independent with ADLs but uses cane for ambulation with recent falls. Number of Personal Factors/Comorbidities that affect the Plan of Care:  Pain  Depression  Diabetes  Falls 3+: HIGH COMPLEXITY   EXAMINATION:   Most Recent Physical Functioning:   Gross Assessment:                  Posture:     Balance:  Sitting: Impaired  Sitting - Static: Fair (occasional)  Sitting - Dynamic: Fair (occasional)  Standing: Impaired  Standing - Static: Fair  Standing - Dynamic : Fair Bed Mobility:  Rolling: Minimum assistance  Supine to Sit: Moderate assistance  Scooting: Minimum assistance  Wheelchair Mobility:     Transfers:  Sit to Stand: Contact guard assistance  Stand to Sit: Minimum assistance  Bed to Chair: Minimum assistance  Gait:     Base of Support: Widened;Center of gravity altered  Speed/Sally: Shuffled; Slow  Step Length: Right shortened;Left shortened  Gait Abnormalities: Decreased step clearance;Shuffling gait; Step to gait;Trunk sway increased  Distance (ft): 5 Feet (ft)  Assistive Device: Maxi Gomes rolling  Ambulation - Level of Assistance: Minimal assistance  Interventions: Safety awareness training; Tactile cues; Verbal cues      Body Structures Involved:  1. Nerves  2. Bones  3. Muscles Body Functions Affected:  1. Sensory/Pain  2. Neuromusculoskeletal  3. Movement Related Activities and Participation Affected:  1. General Tasks and Demands  2. Mobility  3. Self Care  4. Domestic Life  5. Community, Social and [place]   Number of elements that affect the Plan of Care: 4+: HIGH COMPLEXITY   CLINICAL PRESENTATION:   Presentation: Stable and uncomplicated: LOW COMPLEXITY   CLINICAL DECISION MAKIN [facility] Short Form  How much difficulty does the patient currently have. .. Unable A Lot A Little None   1. Turning over in bed (including adjusting bedclothes, sheets and blankets)? [] 1   [] 2   [x] 3   [] 4   2. Sitting down on and standing up from a chair with arms ( e.g., wheelchair, bedside commode, etc.)   [] 1   [] 2   [x] 3   [] 4   3. Moving from lying on back to sitting on the side of the bed? [] 1   [] 2   [x] 3   [] 4   How much help from another person does the patient currently need. .. Total A Lot A Little None   4. Moving to and from a bed to a chair (including a wheelchair)? [] 1   [] 2   [x] 3   [] 4   5. Need to walk in hospital room? [] 1   [] 2   [x] 3   [] 4   6. Climbing 3-5 steps with a railing? [] 1   [] 2   [x] 3   [] 4   © , Trustees of [address] [place] [address], under license to [employer]. All rights reserved      Score:  Initial: 18 Most Recent: X (Date: -- )    Interpretation of Tool:  Represents activities that are increasingly more difficult (i.e. Bed mobility, Transfers, Gait). Score 24 23 22-20 19-15 14-10 9-7 6     Modifier CH CI CJ CK CL CM CN      ?  Mobility - Walking and Moving Around:     - CURRENT STATUS: CK - 40%-59% impaired, limited or restricted    - GOAL STATUS: CJ - 20%-39% impaired, limited or restricted    - D/C STATUS:  ---------------To be determined---------------  Payor: Lisa Rosas / Plan: [address] [place] [address] HMO / Product Type: Managed Care Medicare /      Medical Necessity:     · Patient demonstrates good rehab potential due to higher previous functional level. Reason for Services/Other Comments:  · Patient continues to require skilled intervention due to decreased functional mobility and balance. Use of outcome tool(s) and clinical judgement create a POC that gives a: Clear prediction of patient's progress: LOW COMPLEXITY            TREATMENT:   (In addition to Assessment/Re-Assessment sessions the following treatments were rendered)   Pre-treatment Symptoms/Complaints:  Post op back pain  Pain: Initial: 0/10  Pain Intensity 1: 0 (\"no pain, just sore\")  Post Session:  0/10      PM: Therapeutic Activity: (    19 minutes): Therapeutic activities including bed mobility, transfer training, balance training, safety awareness training, ambulation on level ground x5ft, therapeutic exercise instruction and practive and patient education to improve mobility, strength and balance. Required minimal cuing Safety awareness training; Tactile cues; Verbal cues to promote static and dynamic balance in standing and proper transfer techniques with use of RW. Date:  12/1/17 Date:  12/2/17 Date:  12/2/17 PM   ACTIVITY/EXERCISE AM PM AM PM AM PM   Sit to stand  1 x 10  1 x 5       Sitting EOB   X 5 minutes   --   LAQ      x15B   Ankle plantar flexion      x25B   Ankle dorsiflexion      x25B   Shoulder horizontal ab/addiction      x25B   Shoulder press (punches)      x20B   Elbow flexion/extension      x25B   B = bilateral; AA = active assistive; A = active; P = passive      Braces/Orthotics/Lines/Etc:   · Spinal brace  Treatment/Session Assessment:    · Response to Treatment:  See above.   · Interdisciplinary Collaboration:   o Physical Therapist  o Registered Nurse  · After treatment position/precautions:   o Up in chair  o Bed/Chair-wheels locked  o Bed in low position  o Call light within reach  o RN notified  o RN notified; instructed patient to call for assistance back to bed; verbalized understanding; more awake and alert at end of treatment session; NAD   · Compliance with Program/Exercises: Will assess as treatment progresses. · Recommendations/Intent for next treatment session: \"Next visit will focus on advancements to more challenging activities and reduction in assistance provided\".   Total Treatment Duration:  PT Patient Time In/Time Out  Time In: 1311  Time Out: 10 [facility], [facility]

## 2017-12-02 NOTE — PROGRESS NOTES
Problem: Mobility Impaired (Adult and Pediatric)  Goal: *Acute Goals and Plan of Care (Insert Text)  LTG:  (1.)Ms. Niko Barillas will move from supine to sit and sit to supine and roll side to side, using log roll technique, with MODIFIED INDEPENDENCE within 7 day(s). (2.)Ms. Niko Barillas will transfer from bed to chair and chair to bed with MODIFIED INDEPENDENCE using the least restrictive device within 7 day(s). (3.)Ms. Niko Barillas will ambulate with MODIFIED INDEPENDENCE for 500 feet with the least restrictive device within 7 day(s). (4.)Ms. Niko Barillas will ascend and descend 4 stairs using B hand rail(s) with SUPERVISION to improve functional mobility and safety within 7 day(s). ________________________________________________________________________________________________      PHYSICAL THERAPY: Daily Note, Treatment Day: 1st, AM 12/2/2017  INPATIENT: Hospital Day: 3  Payor: Adrian Chris / Plan: [address] [place] [address] HMO / Product Type: Managed Care Medicare /    Spinal brace and spinal precautions     NAME/AGE/GENDER: Anne Moreno is a 75 y.o. female   PRIMARY DIAGNOSIS: Lumbar spinal stenosis [M48.061] <principal problem not specified> <principal problem not specified>  Procedure(s) (LRB):  LEFT L3- L4 OPEN TLIF WITH REVISION OF HARDWARE (N/A)  2 Days Post-Op  ICD-10: Treatment Diagnosis:   · Generalized Muscle Weakness (M62.81)  · Other abnormalities of gait and mobility (R26.89)   Precaution/Allergies:  No known allergies      ASSESSMENT:     Ms. Niko Barillas was asleep supine in bed, but verbally agreed to PT upon arrival. Pt was extremely lethargic and was off-and-on falling asleep throughout session, however pt continued to agree to PT. Pt able to perform log roll from supine with minimal assitance and verbal cuing. Pt required moderate assistance to transfer from side-lying to sitting at EOB. Patient sat at EOB x 5 minutes, with minimal assistance. Pt stated she was too tired and lethargic to continue with session.  Pt performed sit to side-lying to supine transfer with CGA. Pt required Lianne for LE advancement once lying in supine. Pt positioned to comfort, supine in bed, with all needs within reach and RN present upon PT exit. Will continue POC. This section established at most recent assessment   PROBLEM LIST (Impairments causing functional limitations):  1. Decreased Strength  2. Decreased Transfer Abilities  3. Decreased Ambulation Ability/Technique  4. Decreased Balance   INTERVENTIONS PLANNED: (Benefits and precautions of physical therapy have been discussed with the patient.)  1. Balance Exercise  2. Bed Mobility  3. Family Education  4. Gait Training  5. Neuromuscular Re-education/Strengthening  6. Therapeutic Activites  7. Therapeutic Exercise/Strengthening  8. Transfer Training  9. Group Therapy     TREATMENT PLAN: Frequency/Duration: twice daily for duration of hospital stay  Rehabilitation Potential For Stated Goals: Good     RECOMMENDED REHABILITATION/EQUIPMENT: (at time of discharge pending progress): Due to the probability of continued deficits (see above) this patient will not likely need continued skilled physical therapy after discharge. Equipment:   [employer], Type: Commode              HISTORY:   History of Present Injury/Illness (Reason for Referral):  S/P: LEFT L3- L4 OPEN TLIF WITH REVISION OF HARDWARE (N/A)  Past Medical History/Comorbidities:   Ms. Dorota Todd  has a past medical history of Anxiety; Chronic pain; Depression; Diabetes mellitus, type 2 ([address] GERD (gastroesophageal reflux disease); UTI (urinary tract infection); Hypercholesterolemia; Hypertension; Morbid obesity ([address] and OA (osteoarthritis). She also has no past medical history of Difficult intubation; Malignant hyperthermia due to anesthesia; Nausea & vomiting; or Pseudocholinesterase deficiency.   Ms. Dorota Todd  has a past surgical history that includes tubal ligation; hysterectomy; cervical fusion; orthopaedic; knee replacement; and appendectomy. Social History/Living Environment:   Home Environment: Private residence  # Steps to Enter: 4  Rails to Enter: Yes  Hand Rails : Bilateral  One/Two Story Residence: One story  Living Alone: No  Support Systems: Spouse/Significant Other/Partner  Patient Expects to be Discharged to[de-identified] Private residence  Current DME Used/Available at Home: Moe Emeli, straight, Walker, rolling, Commode, bedside  Tub or Shower Type: Shower  Prior Level of Function/Work/Activity:  Lives with  and family in one story home with steps to enter. Independent with ADLs but uses cane for ambulation with recent falls. Number of Personal Factors/Comorbidities that affect the Plan of Care:  Pain  Depression  Diabetes  Falls 3+: HIGH COMPLEXITY   EXAMINATION:   Most Recent Physical Functioning:   Gross Assessment:  AROM: Within functional limits  Strength: Generally decreased, functional  Sensation: Impaired               Posture:  Posture Assessment: Forward head  Balance:  Sitting: Impaired Bed Mobility:  Rolling: Minimum assistance  Supine to Sit: Moderate assistance  Wheelchair Mobility:     Transfers:     Gait:            Body Structures Involved:  1. Nerves  2. Bones  3. Muscles Body Functions Affected:  1. Sensory/Pain  2. Neuromusculoskeletal  3. Movement Related Activities and Participation Affected:  1. General Tasks and Demands  2. Mobility  3. Self Care  4. Domestic Life  5. Community, Social and [place]   Number of elements that affect the Plan of Care: 4+: HIGH COMPLEXITY   CLINICAL PRESENTATION:   Presentation: Stable and uncomplicated: LOW COMPLEXITY   CLINICAL DECISION MAKIN [facility] Mobility Inpatient Short Form  How much difficulty does the patient currently have. .. Unable A Lot A Little None   1. Turning over in bed (including adjusting bedclothes, sheets and blankets)? [] 1   [] 2   [x] 3   [] 4   2.   Sitting down on and standing up from a chair with arms ( e.g., wheelchair, bedside commode, etc.)   [] 1   [] 2   [x] 3   [] 4   3. Moving from lying on back to sitting on the side of the bed? [] 1   [] 2   [x] 3   [] 4   How much help from another person does the patient currently need. .. Total A Lot A Little None   4. Moving to and from a bed to a chair (including a wheelchair)? [] 1   [] 2   [x] 3   [] 4   5. Need to walk in hospital room? [] 1   [] 2   [x] 3   [] 4   6. Climbing 3-5 steps with a railing? [] 1   [] 2   [x] 3   [] 4   © 2007, Trustees of [address] [place] [address] [address] [address], under license to [employer]. All rights reserved      Score:  Initial: 18 Most Recent: X (Date: -- )    Interpretation of Tool:  Represents activities that are increasingly more difficult (i.e. Bed mobility, Transfers, Gait). Score 24 23 22-20 19-15 14-10 9-7 6     Modifier CH CI CJ CK CL CM CN      ? Mobility - Walking and Moving Around:     - CURRENT STATUS: CK - 40%-59% impaired, limited or restricted    - GOAL STATUS: CJ - 20%-39% impaired, limited or restricted    - D/C STATUS:  ---------------To be determined---------------  Payor: Yasmany Gauze / Plan: [address] [place] [address] HMO / Product Type: Managed Care Medicare /      Medical Necessity:     · Patient demonstrates good rehab potential due to higher previous functional level. Reason for Services/Other Comments:  · Patient continues to require skilled intervention due to decreased functional mobility and balance. Use of outcome tool(s) and clinical judgement create a POC that gives a: Clear prediction of patient's progress: LOW COMPLEXITY            TREATMENT:   (In addition to Assessment/Re-Assessment sessions the following treatments were rendered)   Pre-treatment Symptoms/Complaints:  Post op back pain  Pain: Initial: 0/10     Post Session:  0/10      Therapeutic Activity: (    15 minutes):   Therapeutic activities including Chair transfers, toilet transfers,bed transfers, Ambulation on level ground and standing activities to improve mobility, strength and balance. Required minimal cuing   to promote static and dynamic balance in standing and proper transfer techniques with use of RW. Date:  12/1/17 Date:  12/2/17 Date:     ACTIVITY/EXERCISE AM PM AM PM AM PM   Sit to stand  1 x 10  1 x 5       Sitting EOB   X 5 minutes                                                   B = bilateral; AA = active assistive; A = active; P = passive      Braces/Orthotics/Lines/Etc:   · Spinal brace  Treatment/Session Assessment:    · Response to Treatment:  See above. · Interdisciplinary Collaboration:   o Physical Therapist  o Registered Nurse  · After treatment position/precautions:   o Supine in bed  o Call light within reach  o RN notified  o Nurse at bedside   · Compliance with Program/Exercises: Will assess as treatment progresses. · Recommendations/Intent for next treatment session: \"Next visit will focus on advancements to more challenging activities and reduction in assistance provided\".   Total Treatment Duration:  PT Patient Time In/Time Out  Time In: 9778  Time Out: 433 [facility], [facility]

## 2017-12-02 NOTE — PROGRESS NOTES
Pt doing well, no complaints. Eating, voiding, not yet mobilizing. AFVSS  Neuro intact  Wound OK      Stable, mobilize today.

## 2017-12-03 LAB
GLUCOSE BLD STRIP.AUTO-MCNC: 119 MG/DL (ref 65–100)
GLUCOSE BLD STRIP.AUTO-MCNC: 139 MG/DL (ref 65–100)
GLUCOSE BLD STRIP.AUTO-MCNC: 141 MG/DL (ref 65–100)
GLUCOSE BLD STRIP.AUTO-MCNC: 153 MG/DL (ref 65–100)

## 2017-12-03 PROCEDURE — 97530 THERAPEUTIC ACTIVITIES: CPT

## 2017-12-03 PROCEDURE — 74011250637 HC RX REV CODE- 250/637: Performed by: NEUROLOGICAL SURGERY

## 2017-12-03 PROCEDURE — 74011250636 HC RX REV CODE- 250/636: Performed by: NEUROLOGICAL SURGERY

## 2017-12-03 PROCEDURE — 74011636637 HC RX REV CODE- 636/637: Performed by: NEUROLOGICAL SURGERY

## 2017-12-03 PROCEDURE — 82962 GLUCOSE BLOOD TEST: CPT

## 2017-12-03 PROCEDURE — 65270000029 HC RM PRIVATE

## 2017-12-03 RX ADMIN — PREGABALIN 300 MG: 150 CAPSULE ORAL at 23:18

## 2017-12-03 RX ADMIN — HEPARIN SODIUM 5000 UNITS: 5000 INJECTION, SOLUTION INTRAVENOUS; SUBCUTANEOUS at 23:21

## 2017-12-03 RX ADMIN — FAMOTIDINE 20 MG: 20 TABLET, FILM COATED ORAL at 08:15

## 2017-12-03 RX ADMIN — HYDROCODONE BITARTRATE AND ACETAMINOPHEN 1 TABLET: 7.5; 325 TABLET ORAL at 18:34

## 2017-12-03 RX ADMIN — INSULIN LISPRO 2 UNITS: 100 INJECTION, SOLUTION INTRAVENOUS; SUBCUTANEOUS at 11:46

## 2017-12-03 RX ADMIN — METFORMIN HYDROCHLORIDE 500 MG: 500 TABLET, FILM COATED ORAL at 16:57

## 2017-12-03 RX ADMIN — FAMOTIDINE 20 MG: 20 TABLET, FILM COATED ORAL at 23:20

## 2017-12-03 RX ADMIN — Medication 5 ML: at 08:24

## 2017-12-03 RX ADMIN — PRAVASTATIN SODIUM 40 MG: 20 TABLET ORAL at 23:20

## 2017-12-03 RX ADMIN — Medication 10 ML: at 23:24

## 2017-12-03 RX ADMIN — STANDARDIZED SENNA CONCENTRATE AND DOCUSATE SODIUM 2 TABLET: 8.6; 5 TABLET, FILM COATED ORAL at 08:24

## 2017-12-03 RX ADMIN — Medication 5 ML: at 05:05

## 2017-12-03 RX ADMIN — HEPARIN SODIUM 5000 UNITS: 5000 INJECTION, SOLUTION INTRAVENOUS; SUBCUTANEOUS at 08:25

## 2017-12-03 NOTE — PROGRESS NOTES
Pt doing well, no complaints. Eating, voiding, not yet mobilizing independently. AFVSS  Neuro intact  Wound OK      Stable, mobilize today.

## 2017-12-03 NOTE — PROGRESS NOTES
Problem: Mobility Impaired (Adult and Pediatric)  Goal: *Acute Goals and Plan of Care (Insert Text)  LTG:  (1.)Ms. Severo Spaniel will move from supine to sit and sit to supine and roll side to side, using log roll technique, with MODIFIED INDEPENDENCE within 7 day(s). (2.)Ms. Severo Spaniel will transfer from bed to chair and chair to bed with MODIFIED INDEPENDENCE using the least restrictive device within 7 day(s). (3.)Ms. Severo Spaniel will ambulate with MODIFIED INDEPENDENCE for 500 feet with the least restrictive device within 7 day(s). (4.)Ms. Severo Spaniel will ascend and descend 4 stairs using B hand rail(s) with SUPERVISION to improve functional mobility and safety within 7 day(s). ________________________________________________________________________________________________      PHYSICAL THERAPY: Daily Note, Treatment Day: 2nd, AM 12/3/2017  INPATIENT: Hospital Day: 4  Payor: Vladimir Pandey / Plan: [address] [place] [address] HMO / Product Type: Managed Care Medicare /      Spinal brace and spinal precautions     NAME/AGE/GENDER: Virginia Guzman is a 75 y.o. female   PRIMARY DIAGNOSIS: Lumbar spinal stenosis [M48.061] <principal problem not specified> <principal problem not specified>  Procedure(s) (LRB):  LEFT L3- L4 OPEN TLIF WITH REVISION OF HARDWARE (N/A)  3 Days Post-Op  ICD-10: Treatment Diagnosis:   · Generalized Muscle Weakness (M62.81)  · Other abnormalities of gait and mobility (R26.89)   Precaution/Allergies:  No known allergies      ASSESSMENT:     Ms. Severo Spaniel is a 75year old female 3 days s/p left L3-L4 TLIF with lumbar ASPEN brace and spinal precautions. Patient presents supine in bed and agreeable to PT upon PT arrival. Performed rolling with modified independence, supine to sitting with stand by assistance, scooting with stand by assistance, and demonstrated good static and fair- dynamic sitting balance. Moderate assistance to don lumbar brace secondary to sitting balance.  Addressed sitting balance, postural retraining, and pre-gait activity at edge of bed. PT donned pt's shoes prior to sit to stand transfer. Performed sit to stand transfer at EOB with minimal assistance and RW. Pt ambulated in hallway x 200 feet with SBA, verbal and manual cues to maintain upright posture, perform bilateral heel strike, and increase step length. Patient repoted 5/10 low back pain during gait training. Patient returned to room and assisted to [facility]. Patient performed stand to sit and sit to supine transfers with SBA. Pt positioned supine in bed, all needs within reach, at PT exit. RN notified of patient status at end of treatment session. Will continue with stated POC. This section established at most recent assessment   PROBLEM LIST (Impairments causing functional limitations):  1. Decreased Strength  2. Decreased Transfer Abilities  3. Decreased Ambulation Ability/Technique  4. Decreased Balance   INTERVENTIONS PLANNED: (Benefits and precautions of physical therapy have been discussed with the patient.)  1. Balance Exercise  2. Bed Mobility  3. Family Education  4. Gait Training  5. Neuromuscular Re-education/Strengthening  6. Therapeutic Activites  7. Therapeutic Exercise/Strengthening  8. Transfer Training  9. Group Therapy     TREATMENT PLAN: Frequency/Duration: twice daily for duration of hospital stay  Rehabilitation Potential For Stated Goals: Good     RECOMMENDED REHABILITATION/EQUIPMENT: (at time of discharge pending progress): Due to the probability of continued deficits (see above) this patient will not likely need continued skilled physical therapy after discharge.   Equipment:   [employer], Type: Commode              HISTORY:   History of Present Injury/Illness (Reason for Referral):  S/P: LEFT L3- L4 OPEN TLIF WITH REVISION OF HARDWARE (N/A)  Past Medical History/Comorbidities:   Ms. Kaitlynn Aguilar  has a past medical history of Anxiety; Chronic pain; Depression; Diabetes mellitus, type 2 ([facility] [address] GERD (gastroesophageal reflux disease); UTI (urinary tract infection); Hypercholesterolemia; Hypertension; Morbid obesity ([address] and OA (osteoarthritis). She also has no past medical history of Difficult intubation; Malignant hyperthermia due to anesthesia; Nausea & vomiting; or Pseudocholinesterase deficiency. Ms. Lisbeth Rivera  has a past surgical history that includes tubal ligation; hysterectomy; cervical fusion; orthopaedic; knee replacement; and appendectomy. Social History/Living Environment:   Home Environment: Private residence  # Steps to Enter: 4  Rails to Enter: Yes  Hand Rails : Bilateral  One/Two Story Residence: One story  Living Alone: No  Support Systems: Spouse/Significant Other/Partner  Patient Expects to be Discharged to[de-identified] Private residence  Current DME Used/Available at Home: [address], [place], straight, Walker, rolling, Commode, bedside  Tub or Shower Type: Shower  Prior Level of Function/Work/Activity:  Lives with  and family in one story home with steps to enter. Independent with ADLs but uses cane for ambulation with recent falls. Number of Personal Factors/Comorbidities that affect the Plan of Care:  Pain  Depression  Diabetes  Falls 3+: HIGH COMPLEXITY   EXAMINATION:   Most Recent Physical Functioning:   Gross Assessment:  AROM: Within functional limits  Strength: Generally decreased, functional  Sensation: Impaired               Posture:  Posture (WDL): Exceptions to WDL  Posture Assessment:  Forward head, Rounded shoulders, Trunk flexion  Balance:  Sitting: Intact  Sitting - Static: Fair (occasional)  Sitting - Dynamic: Poor (constant support)  Standing: Impaired  Standing - Static: Constant support  Standing - Dynamic : Poor Bed Mobility:  Rolling: Modified independent  Supine to Sit: Stand-by asssistance  Sit to Supine: Stand-by asssistance  Scooting: Stand-by asssistance  Wheelchair Mobility:     Transfers:  Sit to Stand: Minimum assistance  Stand to Sit: Stand-by asssistance  Bed to Chair: Minimum assistance  Gait:     Base of Support: Center of gravity altered; Widened  Speed/Sally: Shuffled; Slow  Step Length: Left shortened;Right shortened  Gait Abnormalities: Decreased step clearance;Shuffling gait  Distance (ft): 200 Feet (ft)  Assistive Device: Walker, rolling  Ambulation - Level of Assistance: Stand-by asssistance  Interventions: Safety awareness training; Tactile cues; Verbal cues      Body Structures Involved:  1. Nerves  2. Bones  3. Muscles Body Functions Affected:  1. Sensory/Pain  2. Neuromusculoskeletal  3. Movement Related Activities and Participation Affected:  1. General Tasks and Demands  2. Mobility  3. Self Care  4. Domestic Life  5. Community, Social and [place]   Number of elements that affect the Plan of Care: 4+: HIGH COMPLEXITY   CLINICAL PRESENTATION:   Presentation: Stable and uncomplicated: LOW COMPLEXITY   CLINICAL DECISION MAKIN [facility] Inpatient Short Form  How much difficulty does the patient currently have. .. Unable A Lot A Little None   1. Turning over in bed (including adjusting bedclothes, sheets and blankets)? [] 1   [] 2   [x] 3   [] 4   2. Sitting down on and standing up from a chair with arms ( e.g., wheelchair, bedside commode, etc.)   [] 1   [] 2   [x] 3   [] 4   3. Moving from lying on back to sitting on the side of the bed? [] 1   [] 2   [x] 3   [] 4   How much help from another person does the patient currently need. .. Total A Lot A Little None   4. Moving to and from a bed to a chair (including a wheelchair)? [] 1   [] 2   [x] 3   [] 4   5. Need to walk in hospital room? [] 1   [] 2   [x] 3   [] 4   6. Climbing 3-5 steps with a railing? [] 1   [] 2   [x] 3   [] 4   © , Trustees of [address] [place] [address] [address] [address], under license to [employer].  All rights reserved      Score:  Initial: 18 Most Recent: X (Date: -- )    Interpretation of Tool:  Represents activities that are increasingly more difficult (i.e. Bed mobility, Transfers, Gait). Score 24 23 22-20 19-15 14-10 9-7 6     Modifier CH CI CJ CK CL CM CN      ? Mobility - Walking and Moving Around:     - CURRENT STATUS: CK - 40%-59% impaired, limited or restricted    - GOAL STATUS: CJ - 20%-39% impaired, limited or restricted    - D/C STATUS:  ---------------To be determined---------------  Payor: Bridgette Garcia / Plan: [address] [place] [address] HMO / Product Type: Managed Care Medicare /      Medical Necessity:     · Patient demonstrates good rehab potential due to higher previous functional level. Reason for Services/Other Comments:  · Patient continues to require skilled intervention due to decreased functional mobility and balance. Use of outcome tool(s) and clinical judgement create a POC that gives a: Clear prediction of patient's progress: LOW COMPLEXITY            TREATMENT:   (In addition to Assessment/Re-Assessment sessions the following treatments were rendered)   Pre-treatment Symptoms/Complaints:  Post op back pain  Pain: Initial: 0/10     Post Session:  0/10      PM: Therapeutic Activity: (     26 minutes): Therapeutic activities including bed mobility, transfer training, balance training, safety awareness training, ambulation on level ground x 200ft, and patient education to improve mobility, strength and balance. Required minimal cuing Safety awareness training; Tactile cues; Verbal cues to promote static and dynamic balance in standing and proper transfer techniques with use of RW.       Date:  12/1/17 Date:  12/2/17 Date:  12/2/17 PM   ACTIVITY/EXERCISE AM PM AM PM AM PM   Sit to stand  1 x 10  1 x 5       Sitting EOB   X 5 minutes   --   LAQ      x15B   Ankle plantar flexion      x25B   Ankle dorsiflexion      x25B   Shoulder horizontal ab/addiction      x25B   Shoulder press (punches)      x20B   Elbow flexion/extension      x25B   B = bilateral; AA = active assistive; A = active; P = passive      Braces/Orthotics/Lines/Etc: · Spinal brace  Treatment/Session Assessment:    · Response to Treatment:  See above. · Interdisciplinary Collaboration:   o Physical Therapist  o Registered Nurse  · After treatment position/precautions:   o Supine in bed  o Bed/Chair-wheels locked  o Bed in low position  o Call light within reach  o RN notified   · Compliance with Program/Exercises: Will assess as treatment progresses. · Recommendations/Intent for next treatment session: \"Next visit will focus on advancements to more challenging activities and reduction in assistance provided\".   Total Treatment Duration:  PT Patient Time In/Time Out  Time In: 0943  Time Out: [address], DPT

## 2017-12-04 VITALS
SYSTOLIC BLOOD PRESSURE: 99 MMHG | TEMPERATURE: 97.7 F | BODY MASS INDEX: 33.9 KG/M2 | HEIGHT: 62 IN | HEART RATE: 87 BPM | DIASTOLIC BLOOD PRESSURE: 64 MMHG | RESPIRATION RATE: 18 BRPM | WEIGHT: 184.2 LBS | OXYGEN SATURATION: 90 %

## 2017-12-04 PROBLEM — E78.00 HYPERCHOLESTEROLEMIA: Status: ACTIVE | Noted: 2017-12-04

## 2017-12-04 PROBLEM — I10 HYPERTENSION: Status: ACTIVE | Noted: 2017-12-04

## 2017-12-04 PROBLEM — E11.9 DIABETES MELLITUS, TYPE 2 (HCC): Status: ACTIVE | Noted: 2017-12-04

## 2017-12-04 LAB
GLUCOSE BLD STRIP.AUTO-MCNC: 127 MG/DL (ref 65–100)
GLUCOSE BLD STRIP.AUTO-MCNC: 155 MG/DL (ref 65–100)

## 2017-12-04 PROCEDURE — 97530 THERAPEUTIC ACTIVITIES: CPT

## 2017-12-04 PROCEDURE — 74011250637 HC RX REV CODE- 250/637: Performed by: NEUROLOGICAL SURGERY

## 2017-12-04 PROCEDURE — 90471 IMMUNIZATION ADMIN: CPT

## 2017-12-04 PROCEDURE — 82962 GLUCOSE BLOOD TEST: CPT

## 2017-12-04 PROCEDURE — 74011250636 HC RX REV CODE- 250/636: Performed by: NEUROLOGICAL SURGERY

## 2017-12-04 PROCEDURE — 90686 IIV4 VACC NO PRSV 0.5 ML IM: CPT | Performed by: NEUROLOGICAL SURGERY

## 2017-12-04 RX ORDER — HYDROCODONE BITARTRATE AND ACETAMINOPHEN 7.5; 325 MG/1; MG/1
TABLET ORAL
Qty: 60 TAB | Refills: 0 | Status: SHIPPED | OUTPATIENT
Start: 2017-12-04

## 2017-12-04 RX ADMIN — HYDROCODONE BITARTRATE AND ACETAMINOPHEN 1 TABLET: 7.5; 325 TABLET ORAL at 06:35

## 2017-12-04 RX ADMIN — FAMOTIDINE 20 MG: 20 TABLET, FILM COATED ORAL at 10:08

## 2017-12-04 RX ADMIN — INFLUENZA VIRUS VACCINE 0.5 ML: 15; 15; 15; 15 SUSPENSION INTRAMUSCULAR at 08:48

## 2017-12-04 RX ADMIN — Medication 10 ML: at 06:24

## 2017-12-04 RX ADMIN — HEPARIN SODIUM 5000 UNITS: 5000 INJECTION, SOLUTION INTRAVENOUS; SUBCUTANEOUS at 10:08

## 2017-12-04 NOTE — PROGRESS NOTES
3n1 BSC and 5 inch fixed wheeled walker ordered from [place] [id number] to be delivered to pt in room around 1130 today. Orders and notes faxed. Pt aware of pending delivery of DME.

## 2017-12-04 NOTE — DISCHARGE INSTRUCTIONS
Diet diabetic    MAY shower-->NO tub baths    CHANGE dressing DAILY--remove dressing-->shower-->apply new dressing    NO lifting anything heavier than 5LBS     WEAR brace at all times EXCEPT when in bed, just going to the bathroom or showering    NO Bending, Lifting or Twisting    Avoid sitting more than 20 - 30 minutes at a time    NO driving until directed by Dr. Keith Mejias    DO NOT take any NSAIDS (either prescribed or over the counter until directed    (Aleve, Ibuprofen, Mobic, etc) as this will interfere with bone healing    CALL DR. Keith Mejias if:  Fever >100.5  ([phone])               Incision becomes red,  Swollen or opens up             Incision has yellow, thick drainage or an odor             Pain is not managed with prescribed medications             Excessive nausea and/or vomiting    Avoid having pets sleep in bed with you until incision is completely healed          DISCHARGE SUMMARY from Nurse    PATIENT INSTRUCTIONS:    After general anesthesia or intravenous sedation, for 24 hours or while taking prescription Narcotics:  · Limit your activities  · Do not drive and operate hazardous machinery  · Do not make important personal or business decisions  · Do  not drink alcoholic beverages  · If you have not urinated within 8 hours after discharge, please contact your surgeon on call. Report the following to your surgeon:  · Excessive pain, swelling, redness or odor of or around the surgical area  · Temperature over 100.5  · Nausea and vomiting lasting longer than 4 hours or if unable to take medications  · Any signs of decreased circulation or nerve impairment to extremity: change in color, persistent  numbness, tingling, coldness or increase pain  · Any questions    What to do at Home:  Recommended activity: see discharge instructions    If you experience any of the following symptoms see discharge instructions, please follow up with surgeon.     *  Please give a list of your current medications to your Primary Care Provider. *  Please update this list whenever your medications are discontinued, doses are      changed, or new medications (including over-the-counter products) are added. *  Please carry medication information at all times in case of emergency situations. These are general instructions for a healthy lifestyle:    No smoking/ No tobacco products/ Avoid exposure to second hand smoke  Surgeon General's Warning:  Quitting smoking now greatly reduces serious risk to your health. Obesity, smoking, and sedentary lifestyle greatly increases your risk for illness    A healthy diet, regular physical exercise & weight monitoring are important for maintaining a healthy lifestyle    You may be retaining fluid if you have a history of heart failure or if you experience any of the following symptoms:  Weight gain of 3 pounds or more overnight or 5 pounds in a week, increased swelling in our hands or feet or shortness of breath while lying flat in bed. Please call your doctor as soon as you notice any of these symptoms; do not wait until your next office visit. Recognize signs and symptoms of STROKE:    F-face looks uneven    A-arms unable to move or move unevenly    S-speech slurred or non-existent    T-time-call 911 as soon as signs and symptoms begin-DO NOT go       Back to bed or wait to see if you get better-TIME IS BRAIN. Warning Signs of HEART ATTACK     Call 911 if you have these symptoms:   Chest discomfort. Most heart attacks involve discomfort in the center of the chest that lasts more than a few minutes, or that goes away and comes back. It can feel like uncomfortable pressure, squeezing, fullness, or pain.  Discomfort in other areas of the upper body. Symptoms can include pain or discomfort in one or both arms, the back, neck, jaw, or stomach.  Shortness of breath with or without chest discomfort.  Other signs may include breaking out in a cold sweat, nausea, or lightheadedness.   Don't wait more than five minutes to call 911 - MINUTES MATTER! Fast action can save your life. Calling 911 is almost always the fastest way to get lifesaving treatment. Emergency Medical Services staff can begin treatment when they arrive -- up to an hour sooner than if someone gets to the hospital by car. The discharge information has been reviewed with the patient. The patient verbalized understanding. Discharge medications reviewed with the patient and appropriate educational materials and side effects teaching were provided.   ___________________________________________________________________________________________________________________________________

## 2017-12-04 NOTE — PROGRESS NOTES
Discharge instructions ,medication side effects sheet, follow up appointment and prescriptions reviewed and explained to the patient. Patient verbalizes understanding of instructions. A copy of discharge instructions and prescriptions  have been given to patient. Opportunity for questions provided. Patient waiting for equipment to be delivered at 1130 to room .  Patient to be discharged at that time

## 2017-12-04 NOTE — PROGRESS NOTES
Problem: Mobility Impaired (Adult and Pediatric)  Goal: *Acute Goals and Plan of Care (Insert Text)  LTG:  (1.)Ms. Ulises Flores will move from supine to sit and sit to supine and roll side to side, using log roll technique, with MODIFIED INDEPENDENCE within 7 day(s). (2.)Ms. Ulises Flores will transfer from bed to chair and chair to bed with MODIFIED INDEPENDENCE using the least restrictive device within 7 day(s). (3.)Ms. Ulises Flores will ambulate with MODIFIED INDEPENDENCE for 500 feet with the least restrictive device within 7 day(s). (4.)Ms. Ulises Flores will ascend and descend 4 stairs using B hand rail(s) with SUPERVISION to improve functional mobility and safety within 7 day(s). ________________________________________________________________________________________________      PHYSICAL THERAPY: Daily Note, Treatment Day: 3rd, AM 12/4/2017  INPATIENT: Hospital Day: 5  Payor: Miguel Guy / Plan: [address] [place] [address] HMO / Product Type: Managed Care Medicare /      Spinal brace and spinal precautions     NAME/AGE/GENDER: Neri Olivares is a 75 y.o. female   PRIMARY DIAGNOSIS: Lumbar spinal stenosis [M48.061] Lumbar stenosis with neurogenic claudication Lumbar stenosis with neurogenic claudication  Procedure(s) (LRB):  LEFT L3- L4 OPEN TLIF WITH REVISION OF HARDWARE (N/A)  4 Days Post-Op  ICD-10: Treatment Diagnosis:   · Generalized Muscle Weakness (M62.81)  · Other abnormalities of gait and mobility (R26.89)   Precaution/Allergies:  No known allergies      ASSESSMENT:     Ms. Ulises Flores is a 75year old female 3 days s/p left L3-L4 TLIF with lumbar ASPEN brace and spinal precautions. Patient presents supine in bed and agreeable to PT. Performed rolling to the right with  independence, supine to sitting with , scooting with stand by assistance, and demonstrated good static and fair- dynamic sitting balance. Moderate assistance to don lumbar brace secondary to sitting balance.   Performed sit to stand transfer at EOB with minimal assistance and RW. Pt ambulated in hallway x  375 feet with supervision. She also ascended and descended 5 step x 2 with SBA. Pt positioned supine in bed, all needs within reach. Will continue with stated POC. This section established at most recent assessment   PROBLEM LIST (Impairments causing functional limitations):  1. Decreased Strength  2. Decreased Transfer Abilities  3. Decreased Ambulation Ability/Technique  4. Decreased Balance   INTERVENTIONS PLANNED: (Benefits and precautions of physical therapy have been discussed with the patient.)  1. Balance Exercise  2. Bed Mobility  3. Family Education  4. Gait Training  5. Neuromuscular Re-education/Strengthening  6. Therapeutic Activites  7. Therapeutic Exercise/Strengthening  8. Transfer Training  9. Group Therapy     TREATMENT PLAN: Frequency/Duration: twice daily for duration of hospital stay  Rehabilitation Potential For Stated Goals: Good     RECOMMENDED REHABILITATION/EQUIPMENT: (at time of discharge pending progress): Due to the probability of continued deficits (see above) this patient will not likely need continued skilled physical therapy after discharge. Equipment:   SocialShield, Type: Commode              HISTORY:   History of Present Injury/Illness (Reason for Referral):  S/P: LEFT L3- L4 OPEN TLIF WITH REVISION OF HARDWARE (N/A)  Past Medical History/Comorbidities:   Ms. Herminio Mckeon  has a past medical history of Anxiety; Chronic pain; Depression; Diabetes mellitus, type 2 ([address] GERD (gastroesophageal reflux disease); UTI (urinary tract infection); Hypercholesterolemia; Hypertension; Morbid obesity ([address] and OA (osteoarthritis). She also has no past medical history of Difficult intubation; Malignant hyperthermia due to anesthesia; Nausea & vomiting; or Pseudocholinesterase deficiency. Ms. Herminio Mckeon  has a past surgical history that includes tubal ligation; hysterectomy; cervical fusion; orthopaedic; knee replacement; and appendectomy.   Social History/Living Environment:   Home Environment: Private residence  # Steps to Enter: 4  Rails to Enter: Yes  Hand Rails : Bilateral  One/Two Story Residence: One story  Living Alone: No  Support Systems: Spouse/Significant Other/Partner  Patient Expects to be Discharged to[de-identified] Private residence  Current DME Used/Available at Home: [address], [place], straight, Walker, rolling, Commode, bedside  Tub or Shower Type: Shower  Prior Level of Function/Work/Activity:  Lives with  and family in one story home with steps to enter. Independent with ADLs but uses cane for ambulation with recent falls. Number of Personal Factors/Comorbidities that affect the Plan of Care:  Pain  Depression  Diabetes  Falls 3+: HIGH COMPLEXITY   EXAMINATION:   Most Recent Physical Functioning:   Gross Assessment:                  Posture:  Posture (WDL): Exceptions to WDL  Balance:  Sitting: Intact  Sitting - Static: Good (unsupported)  Sitting - Dynamic: Good (unsupported)  Standing: Impaired  Standing - Static: Good  Standing - Dynamic : Good (-) Bed Mobility:  Rolling: Independent  Supine to Sit: Modified independent  Sit to Supine: Modified independent  Wheelchair Mobility:     Transfers:  Sit to Stand: Stand-by asssistance  Stand to Sit: Supervision  Gait:     Base of Support: Narrowed  Speed/Sally: Slow  Step Length: Left shortened;Right shortened  Distance (ft): 375 Feet (ft)  Assistive Device: Walker, rolling  Ambulation - Level of Assistance: Supervision  Number of Stairs Trained: 5 (x 2)  Stairs - Level of Assistance: Stand-by asssistance  Rail Use: Both  Interventions: Safety awareness training;Verbal cues; Visual/Demos      Body Structures Involved:  1. Nerves  2. Bones  3. Muscles Body Functions Affected:  1. Sensory/Pain  2. Neuromusculoskeletal  3. Movement Related Activities and Participation Affected:  1. General Tasks and Demands  2. Mobility  3. Self Care  4. Domestic Life  5.  Community, Social and [place]   Number of elements that affect the Plan of Care: 4+: HIGH COMPLEXITY   CLINICAL PRESENTATION:   Presentation: Stable and uncomplicated: LOW COMPLEXITY   CLINICAL DECISION MAKIN [facility] [address] [address] AM-PAC 6 Clicks   Basic Mobility Inpatient Short Form  How much difficulty does the patient currently have. .. Unable A Lot A Little None   1. Turning over in bed (including adjusting bedclothes, sheets and blankets)? [] 1   [] 2   [x] 3   [] 4   2. Sitting down on and standing up from a chair with arms ( e.g., wheelchair, bedside commode, etc.)   [] 1   [] 2   [x] 3   [] 4   3. Moving from lying on back to sitting on the side of the bed? [] 1   [] 2   [x] 3   [] 4   How much help from another person does the patient currently need. .. Total A Lot A Little None   4. Moving to and from a bed to a chair (including a wheelchair)? [] 1   [] 2   [x] 3   [] 4   5. Need to walk in hospital room? [] 1   [] 2   [x] 3   [] 4   6. Climbing 3-5 steps with a railing? [] 1   [] 2   [x] 3   [] 4   © , Trustees of [facility] [address] [address], under license to [employer]. All rights reserved      Score:  Initial: 18 Most Recent: X (Date: -- )    Interpretation of Tool:  Represents activities that are increasingly more difficult (i.e. Bed mobility, Transfers, Gait). Score 24 23 22-20 19-15 14-10 9-7 6     Modifier CH CI CJ CK CL CM CN      ? Mobility - Walking and Moving Around:     - CURRENT STATUS: CK - 40%-59% impaired, limited or restricted    - GOAL STATUS: CJ - 20%-39% impaired, limited or restricted    - D/C STATUS:  ---------------To be determined---------------  Payor: Adrian Chris / Plan: [address] [place] [address] HMO / Product Type: Managed Care Medicare /      Medical Necessity:     · Patient demonstrates good rehab potential due to higher previous functional level. Reason for Services/Other Comments:  · Patient continues to require skilled intervention due to decreased functional mobility and balance.    Use of outcome tool(s) and clinical judgement create a POC that gives a: Clear prediction of patient's progress: LOW COMPLEXITY            TREATMENT:   (In addition to Assessment/Re-Assessment sessions the following treatments were rendered)   Pre-treatment Symptoms/Complaints:  Post op back pain  Pain: Initial: 0/10     Post Session:  0/10      Therapeutic Activity: (     23 minutes): Therapeutic activities including bed mobility, transfer training, balance training, safety awareness training, ambulation on level ground x 200ft, and patient education to improve mobility, strength and balance. Required minimal cuing Safety awareness training;Verbal cues; Visual/Demos to promote static and dynamic balance in standing and proper transfer techniques with use of RW. Braces/Orthotics/Lines/Etc:   · Spinal brace  Treatment/Session Assessment:    · Response to Treatment:  See above. · Interdisciplinary Collaboration:   o Physical Therapy Assistant  o Registered Nurse  · After treatment position/precautions:   o Supine in bed  o Bed/Chair-wheels locked  o Bed in low position  o Call light within reach  o RN notified   · Compliance with Program/Exercises: Will assess as treatment progresses. · Recommendations/Intent for next treatment session: \"Next visit will focus on advancements to more challenging activities and reduction in assistance provided\".   Total Treatment Duration:PT Patient Time In/Time Out  Time In: 7569  Time Out: [address] [facility] [place], [facility]

## 2017-12-04 NOTE — DISCHARGE SUMMARY
Discharge Summary     Patient ID:  Julio Roberts  [id number]   80 y.o.  1950    Admit date: 11/30/2017    Discharge Date: 12/4/2017      Admitting Physician: Ela Belle MD     Discharge Physician: Ela Belle MD    Admission Diagnoses: Lumbar spinal stenosis [M48.061]    Last Procedure: Procedure(s):  LEFT L3-L4 OPEN TLIF WITH REVISION OF HARDWARE    Discharge Diagnoses: Principal Problem:    Lumbar stenosis with neurogenic claudication (11/30/2017)    Active Problems:    Hypertension (12/4/2017)      Hypercholesterolemia (12/4/2017)      Diabetes mellitus, type 2 ([facility] (12/4/2017)      Overview: does not check sugars or Know normals,          Consults: None    Significant Diagnostic Studies: None     Hospital Course:   Normal hospital course for this procedure. Disposition: Home    Patient Instructions:   Current Discharge Medication List      START taking these medications    Details   HYDROcodone-acetaminophen (NORCO) 7.5-325 mg per tablet 1-2 tabs po q4-6h prn pain  Qty: 60 Tab, Refills: 0         CONTINUE these medications which have NOT CHANGED    Details   pravastatin (PRAVACHOL) 40 mg tablet Take 40 mg by mouth nightly. losartan (COZAAR) 100 mg tablet Take 100 mg by mouth daily. pregabalin (LYRICA) 300 mg capsule Take 300 mg by mouth nightly. alprazolam (XANAX) 1 mg tablet Take 1 mg by mouth as needed for Anxiety or Sleep.      metformin ER (GLUCOPHAGE XR) 500 mg tablet Take 500 mg by mouth daily (with dinner). cholecalciferol (VITAMIN D3) 1,000 unit cap Take 1,000 Units by mouth daily. DOCOSAHEXANOIC ACID/EPA (FISH OIL PO) Take  by mouth daily. STOP taking these medications       meloxicam (MOBIC) 15 mg tablet Comments:   Reason for Stopping:               Diet: Reference my discharge instructions. Activity: Reference my discharge instructions.     Follow-up Appointments   Procedures    FOLLOW UP VISIT Appointment in: One Week My office for wound check My office for wound check     Standing Status:   Standing     Number of Occurrences:   1     Order Specific Question:   Appointment in     Answer: One Week        Total time discharging patient took greater than 30 minutes.     Signed:  Linh French MD  December 4, 2017  7:37 AM

## 2017-12-04 NOTE — PROGRESS NOTES
Pt doing well, no complaints. Eating, voiding, ambulating.     AFVSS  Neuro intact  Wounds OK    Stable, instructed for D/C

## 2017-12-08 ENCOUNTER — APPOINTMENT (OUTPATIENT)
Dept: ULTRASOUND IMAGING | Age: 67
End: 2017-12-08
Attending: EMERGENCY MEDICINE
Payer: MEDICARE

## 2017-12-08 ENCOUNTER — HOSPITAL ENCOUNTER (EMERGENCY)
Age: 67
Discharge: HOME | End: 2017-12-08
Attending: EMERGENCY MEDICINE
Payer: MEDICARE

## 2017-12-08 VITALS
TEMPERATURE: 97.4 F | HEIGHT: 62 IN | HEART RATE: 80 BPM | BODY MASS INDEX: 33.86 KG/M2 | SYSTOLIC BLOOD PRESSURE: 149 MMHG | WEIGHT: 184 LBS | OXYGEN SATURATION: 97 % | RESPIRATION RATE: 20 BRPM | DIASTOLIC BLOOD PRESSURE: 77 MMHG

## 2017-12-08 DIAGNOSIS — R60.9 PERIPHERAL EDEMA: Primary | ICD-10-CM

## 2017-12-08 PROCEDURE — 93971 EXTREMITY STUDY: CPT

## 2017-12-08 PROCEDURE — 99282 EMERGENCY DEPT VISIT SF MDM: CPT | Performed by: EMERGENCY MEDICINE

## 2017-12-08 NOTE — ED PROVIDER NOTES
HPI Comments: Patient presents to the ER complaining of left lower scrotum knee pain and swelling. States symptoms started past couple days. She is status post recent back surgery. Denies any fevers, chills, cough or shortness of breath    Patient is a 75 y.o. female presenting with leg pain. Leg Pain    This is a new problem. The current episode started 2 days ago. The problem occurs constantly. The problem has not changed since onset. The pain is present in the left lower leg. The pain is at a severity of 2/10. Pertinent negatives include no numbness, full range of motion and no back pain. Past Medical History:   Diagnosis Date    Anxiety     Chronic pain     lower back    Depression     Diabetes mellitus, type 2 (HCC)     does not check sugars or Know normals,     GERD (gastroesophageal reflux disease)     PRN meds    Hx: UTI (urinary tract infection)     pt states she's prone to get    Hypercholesterolemia     Hypertension     Morbid obesity ([facility]     OA (osteoarthritis)     spine, generalized joints       Past Surgical History:   Procedure Laterality Date    HX APPENDECTOMY      HX CERVICAL FUSION      HX HYSTERECTOMY      HX KNEE REPLACEMENT      left    HX ORTHOPAEDIC      right carpal tunnel    HX TUBAL LIGATION           Family History:   Problem Relation Age of Onset    Lung Disease Mother     Cancer Father      kidney       Social History     Social History    Marital status:      Spouse name: N/A    Number of children: N/A    Years of education: N/A     Occupational History    Not on file. Social History Main Topics    Smoking status: Never Smoker    Smokeless tobacco: Never Used    Alcohol use No    Drug use: No    Sexual activity: Not on file     Other Topics Concern    Not on file     Social History Narrative         ALLERGIES: No known allergies    Review of Systems   Constitutional: Negative for fatigue and fever.    HENT: Negative for congestion and dental problem. Eyes: Negative for photophobia, redness and visual disturbance. Respiratory: Negative for choking and chest tightness. Cardiovascular: Negative for palpitations and leg swelling. Gastrointestinal: Negative for abdominal pain, nausea and vomiting. Endocrine: Negative for polydipsia, polyphagia and polyuria. Genitourinary: Negative for dysuria and urgency. Musculoskeletal: Negative for back pain. Skin: Negative for pallor and rash. Allergic/Immunologic: Negative for food allergies and immunocompromised state. Neurological: Negative for syncope, speech difficulty, light-headedness, numbness and headaches. Hematological: Negative for adenopathy. Does not bruise/bleed easily. Psychiatric/Behavioral: Negative for behavioral problems and confusion. All other systems reviewed and are negative. Vitals:    12/08/17 1253   BP: 149/77   Pulse: 80   Resp: 20   Temp: 97.4 °F (36.3 °C)   SpO2: 97%   Weight: 83.5 kg (184 lb)   Height: 5' 2\" (1.575 m)            Physical Exam   Constitutional: She is oriented to person, place, and time. She appears well-developed and well-nourished. Eyes: Conjunctivae and EOM are normal. Pupils are equal, round, and reactive to light. Cardiovascular: Normal rate. Pulmonary/Chest: Effort normal and breath sounds normal.   Abdominal: Soft. Bowel sounds are normal.   Musculoskeletal: Normal range of motion. She exhibits no edema. Neurological: She is alert and oriented to person, place, and time. Nursing note and vitals reviewed. MDM  Number of Diagnoses or Management Options  Diagnosis management comments: Will perform ultrasound here to rule out DVT    2:41 PM  Ultrasound negative for DVT.   Discussed with patient further symptomatic care including using  compression stockings and elevating legs       Amount and/or Complexity of Data Reviewed  Tests in the radiology section of CPT®: ordered and reviewed    Risk of Complications, Morbidity, and/or Mortality  Presenting problems: low  Diagnostic procedures: low  Management options: low      ED Course       Procedures

## 2017-12-08 NOTE — DISCHARGE INSTRUCTIONS
Leg and Ankle Edema: Care Instructions  Your Care Instructions  Swelling in the legs, ankles, and feet is called edema. It is common after you sit or stand for a while. Long plane flights or car rides often cause swelling in the legs and feet. You may also have swelling if you have to stand for long periods of time at your job. Problems with the veins in the legs (varicose veins) and changes in hormones can also cause swelling. Sometimes the swelling in the ankles and feet is caused by a more serious problem, such as heart failure, infection, blood clots, or liver or kidney disease. Follow-up care is a key part of your treatment and safety. Be sure to make and go to all appointments, and call your doctor if you are having problems. It's also a good idea to know your test results and keep a list of the medicines you take. How can you care for yourself at home? · If your doctor gave you medicine, take it as prescribed. Call your doctor if you think you are having a problem with your medicine. · Whenever you are resting, raise your legs up. Try to keep the swollen area higher than the level of your heart. · Take breaks from standing or sitting in one position. ¨ Walk around to increase the blood flow in your lower legs. ¨ Move your feet and ankles often while you stand, or tighten and relax your leg muscles. · Wear support stockings. Put them on in the morning, before swelling gets worse. · Eat a balanced diet. Lose weight if you need to. · Limit the amount of salt (sodium) in your diet. Salt holds fluid in the body and may increase swelling. When should you call for help? Call 911 anytime you think you may need emergency care. For example, call if:  ? · You have symptoms of a blood clot in your lung (called a pulmonary embolism). These may include:  ¨ Sudden chest pain. ¨ Trouble breathing. ¨ Coughing up blood.    ?Call your doctor now or seek immediate medical care if:  ? · You have signs of a blood clot, such as:  ¨ Pain in your calf, back of the knee, thigh, or groin. ¨ Redness and swelling in your leg or groin. ? · You have symptoms of infection, such as:  ¨ Increased pain, swelling, warmth, or redness. ¨ Red streaks or pus. ¨ A fever. ? Watch closely for changes in your health, and be sure to contact your doctor if:  ? · Your swelling is getting worse. ? · You have new or worsening pain in your legs. ? · You do not get better as expected. Where can you learn more? Go to http://walter-anup.info/. Enter H964 in the search box to learn more about \"Leg and Ankle Edema: Care Instructions. \"  Current as of: March 20, 2017  Content Version: 11.4  © 0013-3595 [employer]. Care instructions adapted under license by [employer] (which disclaims liability or warranty for this information). If you have questions about a medical condition or this instruction, always ask your healthcare professional. [place] [address] any warranty or liability for your use of this information.

## 2018-03-12 ENCOUNTER — HOSPITAL ENCOUNTER (OUTPATIENT)
Dept: GENERAL RADIOLOGY | Age: 68
Discharge: HOME | End: 2018-03-12
Attending: NEUROLOGICAL SURGERY
Payer: MEDICARE

## 2018-03-12 DIAGNOSIS — M48.062 NEUROGENIC CLAUDICATION DUE TO LUMBAR SPINAL STENOSIS: ICD-10-CM

## 2018-03-12 PROCEDURE — 72100 X-RAY EXAM L-S SPINE 2/3 VWS: CPT

## (undated) DEVICE — SOLUTION IV 1000ML 0.9% SOD CHL

## (undated) DEVICE — BIPOLAR SEALER 23-112-1 AQM 6.0: Brand: AQUAMANTYS ®

## (undated) DEVICE — KENDALL SCD EXPRESS SLEEVES, KNEE LENGTH, MEDIUM: Brand: KENDALL SCD

## (undated) DEVICE — INSTRUMENT 8670015 PAK 2 NEEDLES TROCAR

## (undated) DEVICE — INTENDED FOR TISSUE SEPARATION, AND OTHER PROCEDURES THAT REQUIRE A SHARP SURGICAL BLADE TO PUNCTURE OR CUT.: Brand: BARD-PARKER SAFETY BLADES SIZE 15, STERILE

## (undated) DEVICE — DRAIN SURG 10FR L1/8IN DIA3.2MM SIL CHN RND HUBLESS FULL

## (undated) DEVICE — DRAPE SHT 3 QTR PROXIMA 53X77 --

## (undated) DEVICE — SURGICAL PROCEDURE PACK MIN CV CDS

## (undated) DEVICE — TOOL 14MH30 LEGEND 14CM 3MM: Brand: MIDAS REX ™

## (undated) DEVICE — 3M™ IOBAN™ 2 ANTIMICROBIAL INCISE DRAPE 6648EZ: Brand: IOBAN™ 2

## (undated) DEVICE — SUTURE VCRL SZ 0 L18IN ABSRB VLT L26MM CT-2 1/2 CIR J727D

## (undated) DEVICE — 1010 S-DRAPE TOWEL DRAPE 10/BX: Brand: STERI-DRAPE™

## (undated) DEVICE — 2000CC GUARDIAN II: Brand: GUARDIAN

## (undated) DEVICE — REM POLYHESIVE ADULT PATIENT RETURN ELECTRODE: Brand: VALLEYLAB

## (undated) DEVICE — NEEDLE SPNL 22GA L3.5IN BLK HUB S STL REG WALL FIT STYL W/

## (undated) DEVICE — DERMABOND SKIN ADH 0.7ML -- DERMABOND ADVANCED 12/BX

## (undated) DEVICE — DRAPE TWL SURG 16X26IN BLU ORB04] ALLCARE INC]

## (undated) DEVICE — PENCIL ES L12IN BAYNT MPLR DISP BOVIE

## (undated) DEVICE — INTENDED FOR TISSUE SEPARATION, AND OTHER PROCEDURES THAT REQUIRE A SHARP SURGICAL BLADE TO PUNCTURE OR CUT.: Brand: BARD-PARKER SAFETY BLADES SIZE 10, STERILE

## (undated) DEVICE — SUTURE VCRL SZ 3-0 L18IN ABSRB VLT L26MM SH 1/2 CIR J774D

## (undated) DEVICE — SUTURE VCRL SZ 2-0 L18IN ABSRB VLT L26MM SH 1/2 CIR J775D

## (undated) DEVICE — AMD ANTIMICROBIAL NON-ADHERENT PAD,0.2% POLYHEXAMETHYLENE BIGUANIDE HCI (PHMB): Brand: TELFA

## (undated) DEVICE — (D)PREP SKN CHLRAPRP APPL 26ML -- CONVERT TO ITEM 371833

## (undated) DEVICE — WAX SURG 2.5GM HEMSTAT BNE BEESWAX PARAFFIN ISO PALMITATE

## (undated) DEVICE — SURGIFOAM SPNG SZ 100

## (undated) DEVICE — Device

## (undated) DEVICE — CONTAINER,SPECIMEN,O.R.STRL,4.5OZ: Brand: MEDLINE